# Patient Record
Sex: MALE | Race: WHITE | NOT HISPANIC OR LATINO | Employment: UNEMPLOYED | URBAN - METROPOLITAN AREA
[De-identification: names, ages, dates, MRNs, and addresses within clinical notes are randomized per-mention and may not be internally consistent; named-entity substitution may affect disease eponyms.]

---

## 2017-07-30 ENCOUNTER — APPOINTMENT (OUTPATIENT)
Dept: GENERAL RADIOLOGY | Facility: CLINIC | Age: 22
End: 2017-07-30
Attending: EMERGENCY MEDICINE
Payer: COMMERCIAL

## 2017-07-30 ENCOUNTER — HOSPITAL ENCOUNTER (EMERGENCY)
Facility: CLINIC | Age: 22
Discharge: HOME | End: 2017-07-30
Attending: EMERGENCY MEDICINE | Admitting: EMERGENCY MEDICINE
Payer: COMMERCIAL

## 2017-07-30 VITALS
TEMPERATURE: 98 F | OXYGEN SATURATION: 96 % | RESPIRATION RATE: 17 BRPM | WEIGHT: 150 LBS | HEIGHT: 70 IN | SYSTOLIC BLOOD PRESSURE: 133 MMHG | BODY MASS INDEX: 21.47 KG/M2 | HEART RATE: 76 BPM | DIASTOLIC BLOOD PRESSURE: 83 MMHG
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
## 2017-07-30 DIAGNOSIS — S93.402A SPRAIN OF LEFT ANKLE, UNSPECIFIED LIGAMENT, INITIAL ENCOUNTER: ICD-10-CM

## 2017-07-30 DIAGNOSIS — M25.572 ACUTE LEFT ANKLE PAIN: ICD-10-CM

## 2017-07-30 PROCEDURE — 25000132 ZZH RX MED GY IP 250 OP 250 PS 637: Performed by: EMERGENCY MEDICINE

## 2017-07-30 PROCEDURE — 99283 EMERGENCY DEPT VISIT LOW MDM: CPT

## 2017-07-30 PROCEDURE — 73610 X-RAY EXAM OF ANKLE: CPT | Mod: LT

## 2017-07-30 RX ORDER — IBUPROFEN 800 MG/1
800 TABLET, FILM COATED ORAL ONCE
Status: COMPLETED | OUTPATIENT
Start: 2017-07-30 | End: 2017-07-30

## 2017-07-30 RX ADMIN — IBUPROFEN 800 MG: 800 TABLET, FILM COATED ORAL at 07:17

## 2017-07-30 NOTE — ED AVS SNAPSHOT
[facility] Emergency Department    [address]    [facility] [address]    Phone:  [phone]    Fax:  [phone]                                       Abdulaziz Mart   MRN: [id number]    Department:  [facility] Emergency Department   Date of Visit:  7/30/2017           Patient Information     Date Of Birth          1995        Your diagnoses for this visit were:     Fall, initial encounter     Sprain of left ankle, unspecified ligament, initial encounter     Acute left ankle pain        You were seen by William Lozano MD.      Follow-up Information     Follow up with your primary doctor. Schedule an appointment as soon as possible for a visit in 3 days.        Discharge Instructions       Discharge Instructions  Splint Care    You had a splint put on today to help protect your injury and help it heal.  Splints are used to treat things like strains, sprains, cuts and fractures (broken bones).    Be sure your splint is not too tight!  If you splint is too tight, it may cause loss of blood supply.  Signs of your splint being too tight include:  your arm or leg hurting a lot more; your fingers or toes getting numb, cold, pale or blue; or your child is crying, fussing or seeming restless.    Return to the Emergency Department right away if:    You have increased pain or pressure around the injury.    You have numbness, tingling, or cool, pale, or blue toes or fingers past the injury.    Your child is more fussy than normal, crying a lot, or restless.    Your splint becomes soft, breaks, or is wet.    Your splint begins to smell bad.    Your splint is cutting into your skin.    Home care:    Keep the injured area above the level of your heart while laying or sitting down.  This will help decrease the swelling and the pain.    Keep the splint dry.    Do not put objects down or inside the splint.    If there is an elastic bandage (Ace  wrap) holding the splint on  this may be loosened slightly to relieve pressure or pain.  If pain continues return to the Emergency Department right away.    Do not remove your splint by yourself unless told to by your doctor.    Follow-up:  Sometimes the splint put on in the Emergency Department needs to be changed once the swelling has gone down and a more permanent cast needs to be placed.  This is usually done by a bone specialist doctor (Orthopedist).  Follow the instructions given to you by your doctor today.    X-rays:  X-rays done today were read by your doctor but will also be read by a radiologist.  We will contact you if the radiologist sees anything different on the x-ray.  Your regular doctor may also want to review your x-rays on follow-up.    You could have a fracture (break), even if we told you your x-rays were normal. X-rays are not always certain, and some fractures are hard to see and may not show up right away.  Also, your x-ray may look like you have a fracture, even though you do not.  It is important to follow-up with your regular doctor.     If you were given a prescription for medicine here today, be sure to read all of the information (including the package insert) that comes with your prescription.  This will include important information about the medicine, its side effects, and any warnings that you need to know about.  The pharmacist who fills the prescription can provide more information and answer questions you may have about the medicine.  If you have questions or concerns that the pharmacist cannot address, please call or return to the Emergency Department.   Opioid Medication Information    Pain medications are among the most commonly prescribed medicines, so we are including this information for all our patients. If you did not receive pain medication or get a prescription for pain medicine, you can ignore it.     You may have been given a prescription for an opioid (narcotic) pain medicine and/or have  received a pain medicine while here in the Emergency Department. These medicines can make you drowsy or impaired. You must not drive, operate dangerous equipment, or engage in any other dangerous activities while taking these medications. If you drive while taking these medications, you could be arrested for DUI, or driving under the influence. Do not drink any alcohol while you are taking these medications.     Opioid pain medications can cause addiction. If you have a history of chemical dependency of any type, you are at a higher risk of becoming addicted to pain medications.  Only take these prescribed medications to treat your pain when all other options have been tried. Take it for as short a time and as few doses as possible. Store your pain pills in a secure place, as they are frequently stolen and provide a dangerous opportunity for children or visitors in your house to start abusing these powerful medications. We will not replace any lost or stolen medicine.  As soon as your pain is better, you should flush all your remaining medication.     Many prescription pain medications contain Tylenol  (acetaminophen), including Vicodin , Tylenol #3 , Norco , Lortab , and Percocet .  You should not take any extra pills of Tylenol  if you are using these prescription medications or you can get very sick.  Do not ever take more than 3000 mg of acetaminophen in any 24 hour period.    All opioids tend to cause constipation. Drink plenty of water and eat foods that have a lot of fiber, such as fruits, vegetables, prune juice, apple juice and high fiber cereal.  Take a laxative if you don t move your bowels at least every other day. Miralax , Milk of Magnesia, Colace , or Senna  can be used to keep you regular.      Remember that you can always come back to the Emergency Department if you are not able to see your regular doctor in the amount of time listed above, if you get any new symptoms, or if there is anything that  worries you.      24 Hour Appointment Hotline       To make an appointment at any [facility], call [phone] ([phone]). If you don't have a family doctor or clinic, we will help you find one. [place] clinics are conveniently located to serve the needs of you and your family.             Review of your medicines      Our records show that you are taking the medicines listed below. If these are incorrect, please call your family doctor or clinic.        Dose / Directions Last dose taken    albuterol 108 (90 BASE) MCG/ACT Inhaler   Commonly known as:  PROAIR HFA/PROVENTIL HFA/VENTOLIN HFA   Dose:  2 puff   Quantity:  1 Inhaler        Inhale 2 puffs into the lungs every 4 hours as needed.   Refills:  0        fluticasone-salmeterol 100-50 MCG/DOSE diskus inhaler   Commonly known as:  ADVAIR DISKUS   Dose:  1 puff   Quantity:  1 Inhaler        Inhale 1 puff into the lungs 2 times daily.   Refills:  0        traZODone 50 MG tablet   Commonly known as:  DESYREL   Dose:  50 mg   Quantity:  30 tablet        Take 1 tablet by mouth At Bedtime.   Refills:  0                Procedures and tests performed during your visit     Ankle XR, G/E 3 views, left      Orders Needing Specimen Collection     None      Pending Results     Date and Time Order Name Status Description    7/30/2017 0642 Ankle XR, G/E 3 views, left Preliminary             Pending Culture Results     No orders found from 7/28/2017 to 7/31/2017.            Pending Results Instructions     If you had any lab results that were not finalized at the time of your Discharge, you can call the ED Lab Result RN at [phone]. You will be contacted by this team for any positive Lab results or changes in treatment. The nurses are available 7 days a week from 10A to 6:30P.  You can leave a message 24 hours per day and they will return your call.        Test Results From Your Hospital Stay        7/30/2017  7:34 AM      Narrative     LEFT ANKLE THREE OR MORE  VIEWS  7/30/2017  7:02 AM     HISTORY: Twisted.    COMPARISON: None.        Impression     IMPRESSION: No bony or soft tissue abnormality.                Clinical Quality Measure: Blood Pressure Screening     Your blood pressure was checked while you were in the emergency department today. The last reading we obtained was  BP: 142/84 . Please read the guidelines below about what these numbers mean and what you should do about them.  If your systolic blood pressure (the top number) is less than 120 and your diastolic blood pressure (the bottom number) is less than 80, then your blood pressure is normal. There is nothing more that you need to do about it.  If your systolic blood pressure (the top number) is 120-139 or your diastolic blood pressure (the bottom number) is 80-89, your blood pressure may be higher than it should be. You should have your blood pressure rechecked within a year by a primary care provider.  If your systolic blood pressure (the top number) is 140 or greater or your diastolic blood pressure (the bottom number) is 90 or greater, you may have high blood pressure. High blood pressure is treatable, but if left untreated over time it can put you at risk for heart attack, stroke, or kidney failure. You should have your blood pressure rechecked by a primary care provider within the next 4 weeks.  If your provider in the emergency department today gave you specific instructions to follow-up with your doctor or provider even sooner than that, you should follow that instruction and not wait for up to 4 weeks for your follow-up visit.        Thank you for choosing [facility]       Thank you for choosing [facility] for your care. Our goal is always to provide you with excellent care. Hearing back from our patients is one way we can continue to improve our services. Please take a few minutes to complete the written survey that you may receive in the mail after you visit with us. Thank you!        MyChart  "Information     [employer] lets you send messages to your doctor, view your test results, renew your prescriptions, schedule appointments and more. To sign up, go to [email or web address] . Click on \"Log in\" on the left side of the screen, which will take you to the Welcome page. Then click on \"Sign up Now\" on the right side of the page.     You will be asked to enter the access code listed below, as well as some personal information. Please follow the directions to create your username and password.     Your access code is: [email or web address]  Expires: 10/28/2017  7:48 AM     Your access code will  in 90 days. If you need help or a new code, please call your [place] clinic or [phone].        Care EveryWhere ID     This is your Care EveryWhere ID. This could be used by other organizations to access your [place] medical records  [id number]        Equal Access to Services     RODOLFO MORGAN AH: Hadii efrain mcmanuso Sominal, waaxda luqadaha, qaybta kaalmada adetayla, jose dan. So [facility] [phone].    ATENCIÓN: Si habla español, tiene a ahn disposición servicios gratuitos de asistencia lingüística. Llame al [phone].    We comply with applicable federal civil rights laws and [place] laws. We do not discriminate on the basis of race, color, national origin, age, disability sex, sexual orientation or gender identity.            After Visit Summary       This is your record. Keep this with you and show to your community pharmacist(s) and doctor(s) at your next visit.                  "

## 2017-07-30 NOTE — ED NOTES
Pt was out last night has been drinking   Tripped and now with  Left lateral ankle pain   Here for eval  Good pedal pulse

## 2017-07-30 NOTE — ED AVS SNAPSHOT
[facility] Emergency Department    [address]    [facility] [address]    Phone:  [phone]    Fax:  [phone]                                       Abdulaziz Mart   MRN: [id number]    Department:  [facility] Emergency Department   Date of Visit:  7/30/2017           After Visit Summary Signature Page     I have received my discharge instructions, and my questions have been answered. I have discussed any challenges I see with this plan with the nurse or doctor.    ..........................................................................................................................................  Patient/Patient Representative Signature      ..........................................................................................................................................  Patient Representative Print Name and Relationship to Patient    ..................................................               ................................................  Date                                            Time    ..........................................................................................................................................  Reviewed by Signature/Title    ...................................................              ..............................................  Date                                                            Time

## 2017-09-09 ENCOUNTER — HOSPITAL ENCOUNTER (INPATIENT)
Facility: CLINIC | Age: 22
LOS: 4 days | Discharge: HOME | End: 2017-09-13
Attending: EMERGENCY MEDICINE | Admitting: PSYCHIATRY & NEUROLOGY
Payer: COMMERCIAL

## 2017-09-09 DIAGNOSIS — F99 INSOMNIA DUE TO OTHER MENTAL DISORDER: ICD-10-CM

## 2017-09-09 DIAGNOSIS — F51.05 INSOMNIA DUE TO OTHER MENTAL DISORDER: ICD-10-CM

## 2017-09-09 DIAGNOSIS — F32.A DEPRESSION, UNSPECIFIED DEPRESSION TYPE: ICD-10-CM

## 2017-09-09 DIAGNOSIS — F10.10 ALCOHOL ABUSE: ICD-10-CM

## 2017-09-09 DIAGNOSIS — F10.229 ACUTE ALCOHOLIC INTOXICATION IN ALCOHOLISM, WITH UNSPECIFIED COMPLICATION (H): ICD-10-CM

## 2017-09-09 DIAGNOSIS — F41.9 ANXIETY: Primary | ICD-10-CM

## 2017-09-09 LAB
ALCOHOL BREATH TEST: 0.14 (ref 0–0.01)
ALCOHOL BREATH TEST: 0.22 (ref 0–0.01)
AMPHETAMINES UR QL SCN: NEGATIVE
BARBITURATES UR QL: NEGATIVE
BENZODIAZ UR QL: NEGATIVE
CANNABINOIDS UR QL SCN: NEGATIVE
COCAINE UR QL: NEGATIVE
ETHANOL UR QL SCN: POSITIVE
OPIATES UR QL SCN: NEGATIVE

## 2017-09-09 PROCEDURE — 99285 EMERGENCY DEPT VISIT HI MDM: CPT | Mod: Z6 | Performed by: EMERGENCY MEDICINE

## 2017-09-09 PROCEDURE — 80307 DRUG TEST PRSMV CHEM ANLYZR: CPT | Performed by: EMERGENCY MEDICINE

## 2017-09-09 PROCEDURE — 82075 ASSAY OF BREATH ETHANOL: CPT | Performed by: EMERGENCY MEDICINE

## 2017-09-09 PROCEDURE — 82075 ASSAY OF BREATH ETHANOL: CPT | Mod: 91 | Performed by: EMERGENCY MEDICINE

## 2017-09-09 PROCEDURE — HZ2ZZZZ DETOXIFICATION SERVICES FOR SUBSTANCE ABUSE TREATMENT: ICD-10-PCS | Performed by: PSYCHIATRY & NEUROLOGY

## 2017-09-09 PROCEDURE — 80320 DRUG SCREEN QUANTALCOHOLS: CPT | Performed by: EMERGENCY MEDICINE

## 2017-09-09 PROCEDURE — 99285 EMERGENCY DEPT VISIT HI MDM: CPT | Mod: 25 | Performed by: EMERGENCY MEDICINE

## 2017-09-09 PROCEDURE — 12400008 ZZH R&B MH INTERMEDIATE ADOLESCENT

## 2017-09-09 PROCEDURE — 25000132 ZZH RX MED GY IP 250 OP 250 PS 637: Performed by: PSYCHIATRY & NEUROLOGY

## 2017-09-09 PROCEDURE — 90791 PSYCH DIAGNOSTIC EVALUATION: CPT

## 2017-09-09 RX ORDER — OLANZAPINE 5 MG/1
10 TABLET ORAL
Status: DISCONTINUED | OUTPATIENT
Start: 2017-09-09 | End: 2017-09-13 | Stop reason: HOSPADM

## 2017-09-09 RX ORDER — DIAZEPAM 5 MG
5-20 TABLET ORAL EVERY 30 MIN PRN
Status: DISCONTINUED | OUTPATIENT
Start: 2017-09-09 | End: 2017-09-11

## 2017-09-09 RX ORDER — OLANZAPINE 10 MG/2ML
10 INJECTION, POWDER, FOR SOLUTION INTRAMUSCULAR
Status: DISCONTINUED | OUTPATIENT
Start: 2017-09-09 | End: 2017-09-13 | Stop reason: HOSPADM

## 2017-09-09 RX ORDER — ALUMINA, MAGNESIA, AND SIMETHICONE 2400; 2400; 240 MG/30ML; MG/30ML; MG/30ML
30 SUSPENSION ORAL EVERY 4 HOURS PRN
Status: DISCONTINUED | OUTPATIENT
Start: 2017-09-09 | End: 2017-09-13 | Stop reason: HOSPADM

## 2017-09-09 RX ORDER — ATENOLOL 50 MG/1
50 TABLET ORAL DAILY PRN
Status: DISCONTINUED | OUTPATIENT
Start: 2017-09-09 | End: 2017-09-13 | Stop reason: HOSPADM

## 2017-09-09 RX ORDER — ALBUTEROL SULFATE 90 UG/1
2 AEROSOL, METERED RESPIRATORY (INHALATION) EVERY 4 HOURS PRN
Status: DISCONTINUED | OUTPATIENT
Start: 2017-09-09 | End: 2017-09-13 | Stop reason: HOSPADM

## 2017-09-09 RX ORDER — NICOTINE 21 MG/24HR
1 PATCH, TRANSDERMAL 24 HOURS TRANSDERMAL DAILY
Status: DISCONTINUED | OUTPATIENT
Start: 2017-09-09 | End: 2017-09-13 | Stop reason: HOSPADM

## 2017-09-09 RX ORDER — TRAZODONE HYDROCHLORIDE 50 MG/1
50 TABLET, FILM COATED ORAL
Status: DISCONTINUED | OUTPATIENT
Start: 2017-09-09 | End: 2017-09-13 | Stop reason: HOSPADM

## 2017-09-09 RX ORDER — BISACODYL 10 MG
10 SUPPOSITORY, RECTAL RECTAL DAILY PRN
Status: DISCONTINUED | OUTPATIENT
Start: 2017-09-09 | End: 2017-09-13 | Stop reason: HOSPADM

## 2017-09-09 RX ORDER — LANOLIN ALCOHOL/MO/W.PET/CERES
100 CREAM (GRAM) TOPICAL DAILY
Status: COMPLETED | OUTPATIENT
Start: 2017-09-09 | End: 2017-09-11

## 2017-09-09 RX ORDER — ACETAMINOPHEN 325 MG/1
650 TABLET ORAL EVERY 4 HOURS PRN
Status: DISCONTINUED | OUTPATIENT
Start: 2017-09-09 | End: 2017-09-13 | Stop reason: HOSPADM

## 2017-09-09 RX ORDER — FOLIC ACID 1 MG/1
1 TABLET ORAL DAILY
Status: DISCONTINUED | OUTPATIENT
Start: 2017-09-09 | End: 2017-09-13

## 2017-09-09 RX ORDER — MULTIPLE VITAMINS W/ MINERALS TAB 9MG-400MCG
1 TAB ORAL DAILY
Status: DISCONTINUED | OUTPATIENT
Start: 2017-09-09 | End: 2017-09-13 | Stop reason: HOSPADM

## 2017-09-09 RX ORDER — HYDROXYZINE HYDROCHLORIDE 25 MG/1
25-50 TABLET, FILM COATED ORAL EVERY 4 HOURS PRN
Status: DISCONTINUED | OUTPATIENT
Start: 2017-09-09 | End: 2017-09-13 | Stop reason: HOSPADM

## 2017-09-09 RX ADMIN — MULTIPLE VITAMINS W/ MINERALS TAB 1 TABLET: TAB at 17:44

## 2017-09-09 RX ADMIN — FOLIC ACID 1 MG: 1 TABLET ORAL at 17:44

## 2017-09-09 RX ADMIN — NICOTINE 1 PATCH: 14 PATCH, EXTENDED RELEASE TRANSDERMAL at 17:46

## 2017-09-09 RX ADMIN — Medication 100 MG: at 17:44

## 2017-09-09 ASSESSMENT — ACTIVITIES OF DAILY LIVING (ADL)
GROOMING: INDEPENDENT
LAUNDRY: WITH SUPERVISION
DRESS: INDEPENDENT
ORAL_HYGIENE: INDEPENDENT

## 2017-09-09 NOTE — IP AVS SNAPSHOT
MRN:[id number]                      After Visit Summary   9/9/2017    Abdulaziz Mart    MRN: [id number]           Visit Information        Department      9/9/2017  4:23 AM Child Adolescent  Inpatient Unit         Further instructions from your care team       Behavioral Discharge Planning and Instructions      Summary: You were admitted on 9/9/2017 to [facility] for suicidal ideation.  You were treated by Debra Naegele, APRN, CNP and discharged on 9/13/17   to Home    Health Care Follow-up Appointments:   Medication Management  Date: Thursday, September 14, 2017  Time: 12pm. Check in at 1130am      Provider: Gaby Robert via [facility]  Address: [address] [place] [address]. [place], [place]   Phone:[phone]   [facility] has faxed the Discharge Summary and AVS to this provider at Fax: [phone]      Therapy Appointment   Date: Wednesday, September 27, 2017  Time: 11am      Provider: Liborio Cornejo  Address: [address] [place] [address], [facility]  Phone:[phone]      AA Resources   Go to [email or web address] to find the AA meeting closest to you  Alcoholics Anonymous ([email or web address]-anonymous.org)    Attend all scheduled appointments with your outpatient providers. Call at least 24 hours in advance if you need to reschedule an appointment to ensure continued access to your outpatient providers.   Major Treatments, Procedures and Findings: You were provided with: a psychiatric assessment, assessed for medical stability, medication evaluation and/or management and group therapy    Symptoms to Report: feeling more aggressive, increased confusion, losing more sleep, mood getting worse or thoughts of suicide    Early warning signs can include:  increased depression or anxiety sleep disturbances increased thoughts or behaviors of suicide or self-harm     Safety and Wellness:  Take all medicines as directed.  Make no changes unless your doctor suggests them.      Follow treatment  "recommendations.  Refrain from alcohol and non-prescribed drugs.  If there is a concern for safety, call 911.    Resources: Mental health crisis response for your county is offered 24 hours a day, 7 days a week. A trained counselor will assess your current situation, offer support and counseling and connect you with local resources.    [facility] Crisis Response [phone]    The treatment team has appreciated the opportunity to work with you. Drea, please take care and make your recovery a daily recovery. If you have any questions or concerns our unit number is [phone].  You will be receiving a follow-up phone call within the next three days from a representative from behavioral [employer].  You have identified the best phone number to reach you as [phone]                [employer] Information     [employer] lets you send messages to your doctor, view your test results, renew your prescriptions, schedule appointments and more. To sign up, go to [email or web address] . Click on \"Log in\" on the left side of the screen, which will take you to the Welcome page. Then click on \"Sign up Now\" on the right side of the page.     You will be asked to enter the access code listed below, as well as some personal information. Please follow the directions to create your username and password.     Your access code is: [email or web address]  Expires: 10/28/2017  7:48 AM     Your access code will  in 90 days. If you need help or a new code, please call your [place] clinic or [phone].        Care EveryWhere ID     This is your Care EveryWhere ID. This could be used by other organizations to access your [place] medical records  [id number]        Equal Access to Services     [facility] : Edenilson Turcios, arnie salazar, jose cobb. So [facility] [phone].    ATENCIÓN: Si habla español, tiene a ahn disposición servicios gratuitos de asistencia " lingüística. Dillon al [phone].    We comply with applicable federal civil rights laws and [place] laws. We do not discriminate on the basis of race, color, national origin, age, disability sex, sexual orientation or gender identity.

## 2017-09-09 NOTE — IP AVS SNAPSHOT
MRN:[id number]                      After Visit Summary   9/9/2017    Abdulaziz Mart    MRN: [id number]           Thank you!     Thank you for choosing [facility] for your care. Our goal is always to provide you with excellent care.        Patient Information     Date Of Birth          1995        Designated Caregiver       Most Recent Value    Caregiver    Will someone help with your care after discharge? yes    Name of designated caregiver Kyle Harrell    Phone number of caregiver [phone]    Caregiver address [id number] [place], [place] [id number]      About your hospital stay     You were admitted on:  September 9, 2017 You last received care in the:  Child Adolescent  Inpatient Unit    You were discharged on:  September 13, 2017       Who to Call     For medical emergencies, please call 911.  For non-urgent questions about your medical care, please call your primary care provider or clinic, [phone]          Attending Provider     Provider Specialty    Noe Echeverria MD Emergency Medicine    Merly Navas MD Emergency Medicine    Agapito Duarte MD Psychiatry       Primary Care Provider Office Phone # Fax #    [facility] [phone]      Further instructions from your care team       Behavioral Discharge Planning and Instructions      Summary: You were admitted on 9/9/2017 to Station [facility] for suicidal ideation.  You were treated by Debra Naegele, APRN, CNP and discharged on 9/13/17   to Home    Health Care Follow-up Appointments:   Medication Management  Date: Thursday, September 14, 2017  Time: 12pm. Check in at 1130am      Provider: Gaby Robert via Tele Psychiatry  Address: [address] [place], [place]   Phone:[phone]   [facility] has faxed the Discharge Summary and AVS to this provider at Fax: [phone]      Therapy Appointment   Date: Wednesday, September 27, 2017  Time: 11am      Provider: Liborio  Clemente  Address: [address]  Phone:[phone]      AA Resources   Go to [email or web address] to find the AA meeting closest to you  Alcoholics Anonymous (www.alcoholics-anonymous.org)    Attend all scheduled appointments with your outpatient providers. Call at least 24 hours in advance if you need to reschedule an appointment to ensure continued access to your outpatient providers.   Major Treatments, Procedures and Findings: You were provided with: a psychiatric assessment, assessed for medical stability, medication evaluation and/or management and group therapy    Symptoms to Report: feeling more aggressive, increased confusion, losing more sleep, mood getting worse or thoughts of suicide    Early warning signs can include:  increased depression or anxiety sleep disturbances increased thoughts or behaviors of suicide or self-harm     Safety and Wellness:  Take all medicines as directed.  Make no changes unless your doctor suggests them.      Follow treatment recommendations.  Refrain from alcohol and non-prescribed drugs.  If there is a concern for safety, call 911.    Resources: Mental health crisis response for your [facility] is offered 24 hours a day, 7 days a week. A trained counselor will assess your current situation, offer support and counseling and connect you with local resources.    [facility] Crisis Response [phone]    The treatment team has appreciated the opportunity to work with you. Drea, please take care and make your recovery a daily recovery. If you have any questions or concerns our unit number is [phone].  You will be receiving a follow-up phone call within the next three days from a representative from behavioral health.  You have identified the best phone number to reach you as [phone]                Pending Results     No orders found from 9/7/2017 to 9/10/2017.            Admission Information     Date & Time Provider Department Dept. Phone     "2017 Agapito Duarte MD Child Adolescent  Inpatient Unit [phone]      Your Vitals Were     Blood Pressure Pulse Temperature Respirations Height Weight    136/78 71 97.8  F (36.6  C) (Oral) 16 1.727 m (5' 8\") 55.2 kg (121 lb 11.2 oz)    Pulse Oximetry BMI (Body Mass Index)                98% 18.5 kg/m2          [employer] Information     [employer] lets you send messages to your doctor, view your test results, renew your prescriptions, schedule appointments and more. To sign up, go to [email or web address] . Click on \"Log in\" on the left side of the screen, which will take you to the Welcome page. Then click on \"Sign up Now\" on the right side of the page.     You will be asked to enter the access code listed below, as well as some personal information. Please follow the directions to create your username and password.     Your access code is: [email or web address]  Expires: 10/28/2017  7:48 AM     Your access code will  in 90 days. If you need help or a new code, please call your [facility] clinic or [phone].        Care EveryWhere ID     This is your Care EveryWhere ID. This could be used by other organizations to access your [facility] medical records  [id number]        Equal Access to Services     RODOLOF MORGAN AH: Hadii efrain river Sominal, waaxda luqadaha, qaybta kaalmada suman, jose dan. So [facility] [phone].    ATENCIÓN: Si habla español, tiene a ahn disposición servicios gratuitos de asistencia lingüística. Dillon al [phone].    We comply with applicable federal civil rights laws and [place] laws. We do not discriminate on the basis of race, color, national origin, age, disability sex, sexual orientation or gender identity.               Review of your medicines      START taking        Dose / Directions    escitalopram 10 MG tablet   Commonly known as:  LEXAPRO   Used for:  Depression, unspecified depression type        Dose:  10 mg   Take 1 tablet (10 " mg) by mouth daily   Quantity:  30 tablet   Refills:  1       hydrOXYzine 25 MG tablet   Commonly known as:  ATARAX   Used for:  Anxiety        Dose:  25-50 mg   Take 1-2 tablets (25-50 mg) by mouth every 4 hours as needed for anxiety   Quantity:  120 tablet   Refills:  1       traZODone 50 MG tablet   Commonly known as:  DESYREL   Used for:  Insomnia due to other mental disorder        Dose:  50 mg   Take 1 tablet (50 mg) by mouth nightly as needed for sleep   Quantity:  90 tablet   Refills:  1         CONTINUE these medicines which have NOT CHANGED        Dose / Directions    albuterol 108 (90 BASE) MCG/ACT Inhaler   Commonly known as:  PROAIR HFA/PROVENTIL HFA/VENTOLIN HFA   Used for:  Mild persistent asthma        Dose:  2 puff   Inhale 2 puffs into the lungs every 4 hours as needed.   Quantity:  1 Inhaler   Refills:  0            Where to get your medicines      These medications were sent to [facility] [address]  [address] [address]     Phone:  [phone]     escitalopram 10 MG tablet    hydrOXYzine 25 MG tablet    traZODone 50 MG tablet                Protect others around you: Learn how to safely use, store and throw away your medicines at www.disposemymeds.org.             Medication List: This is a list of all your medications and when to take them. Check marks below indicate your daily home schedule. Keep this list as a reference.      Medications           Morning Afternoon Evening Bedtime As Needed    albuterol 108 (90 BASE) MCG/ACT Inhaler   Commonly known as:  PROAIR HFA/PROVENTIL HFA/VENTOLIN HFA   Inhale 2 puffs into the lungs every 4 hours as needed.   Last time this was given:  2 puffs on 9/12/2017  8:41 AM                                escitalopram 10 MG tablet   Commonly known as:  LEXAPRO   Take 1 tablet (10 mg) by mouth daily   Last time this was given:  10 mg on 9/13/2017  8:40 AM                                hydrOXYzine  25 MG tablet   Commonly known as:  ATARAX   Take 1-2 tablets (25-50 mg) by mouth every 4 hours as needed for anxiety   Last time this was given:  50 mg on 9/12/2017  9:18 AM                                traZODone 50 MG tablet   Commonly known as:  DESYREL   Take 1 tablet (50 mg) by mouth nightly as needed for sleep   Last time this was given:  50 mg on 9/12/2017  9:02 PM

## 2017-09-09 NOTE — PROGRESS NOTES
23 yo male arrived on stn 7A [id number], voluntary basis. He is calm and cooperative, bland affect, vss. No acute s/sx w/d, per assessment of Christopher BILLY RN. See intake report for pt's story. Per ED report, dx include depression, anxiety, and schizophrenia; no medical issues-albuterol inhaler listed on med rec. Will report off to [facility] for completion of admission profile. Admission orders received from Dr Zaldivar.

## 2017-09-09 NOTE — ED NOTES
Has been attemping SI since May tried to hang self and friends saved him has been trying 3 to 4 times tonight just has the thoughts of hanging self

## 2017-09-09 NOTE — IP AVS SNAPSHOT
Medication List       Patient:  KARI WILSON   :  May 20, 1995   Physician:  [facility], [place]           This is your record.  Keep this with you and show to your community pharmacist(s) and physician(s) at each visit.     Allergies:  ANIMAL DANDER - (reactions not documented)     OTHER [SEASONAL ALLERGIES] - (reactions not documented)               Medications  Valid as of: 2017 -  3:28 PM    Generic Name Brand Name Tablet Size Instructions for use    Albuterol Sulfate PROAIR HFA/PROVENTIL HFA/VENTOLIN  (90 BASE) MCG/ACT Inhale 2 puffs into the lungs every 4 hours as needed.        Escitalopram Oxalate LEXAPRO 10 MG Take 1 tablet (10 mg) by mouth daily        HydrOXYzine HCl ATARAX 25 MG Take 1-2 tablets (25-50 mg) by mouth every 4 hours as needed for anxiety        TraZODone HCl DESYREL 50 MG Take 1 tablet (50 mg) by mouth nightly as needed for sleep        .           .           .           .

## 2017-09-09 NOTE — ED PROVIDER NOTES
Sign out note: Abdulaziz Mart is a 22 year old male was signed out to me by Dr. Echeverria at 0720 am.  Please see initial dictation for full details and history.  Patient has schizophrenia and is off medications.  He is intoxicated and apparently suicidal..  Plan at time of sign out is to have the BEC  evaluate the patient once he is more sober..   The patient breathalyzed 0.222 at approximate 5 AM.      Course in the ED:  The patient was re-breathalyzed at approximately 1110 am. His breathalyzer is currently 0.135.   He was seen by BEC  Ashley at about 12 noon.  He states he was upset and drinking last night and a friend was talking about his ex-girlfriend, who cheated on him years ago. He has a depressed mood and has tried to hang himself in the past while intoxicated. He states he always has suicidal ideation. He has a h/o Adderal and methamphetamine abuse although he has used neither for a year.   Ashley was able to speak to 3 of this patient's family members.  Each report that the patient appears more and more depressed.  According to his sister, he tried to hang himself in May and actually had to be cut down.  The patient  told Ashley that he has had 5 prior attempts at hanging but is generally too intoxicated to tie the knot.     The patient is a 22-year-old substance abuser who has chronic suicidal ideation, and has tried to hang himself while intoxicated.  His family members are concerned that his depression is worsening.  The patient is willing to be admitted to the hospital today.  He will be admitted to the mental health unit for stabilization.  He will be admitted to station 7A under the care of Dr. Duarte.  He was admitted upstairs at approximately 1430 pm  Clinical impression: #1.  Depression #2, alcohol abuse, #3 acute alcohol intoxication.  MD Eloise Barroso, Merly SEQUEIRA MD  09/09/17 1440

## 2017-09-09 NOTE — IP AVS SNAPSHOT
Child Adolescent  Inpatient Unit    [address] [facility] [address]    Phone:  [phone]    Fax:  [phone]                                       After Visit Summary   9/9/2017    Abdulaziz Mart    MRN: [id number]           After Visit Summary Signature Page     I have received my discharge instructions, and my questions have been answered. I have discussed any challenges I see with this plan with the nurse or doctor.    ..........................................................................................................................................  Patient/Patient Representative Signature      ..........................................................................................................................................  Patient Representative Print Name and Relationship to Patient    ..................................................               ................................................  Date                                            Time    ..........................................................................................................................................  Reviewed by Signature/Title    ...................................................              ..............................................  Date                                                            Time

## 2017-09-09 NOTE — PROGRESS NOTES
"Admission:     Admitted to 4A, young adult unit, for increasing suicidal ideation. States he attempted suicide by hanging \"sometime last summer\" and was stopped by a friend. States he drinks excessively to intoxication, 2-3 times per week. Denies hx of seizures, but admits to DT's. Placed on [facility] protocol for alcohol withdrawal. Has a 1 1/2 year old son that he has never met. States he learned of the birth on facebook. Does report that he pays child support. States his sons mother \"does not want him to come to the [facility]\". Patient currently lives with his father, and 3 younger sisters in [place].     Is ambivalent when asked if he would like to have a relationship with his son. Cites other stressors as a job loss. States he was working as a  and adds, \"but I hate people\". States he quit high school. States he would like to obtain his GED and attend college to study science.     Reports being admitted to unit 6AE at 15, and again at 17 years old. He attended inpatient CD treatment at [facility] when he was 15. And was admitted to [facility] at 17 before signing himself out. Denies any mental health, or CD admissions since. Denies any interventions for depression. Denies hx of schizophrenia, and is currently not on any prescription medications, and denies ever taking prescription medications for depression or psychosis. Does endorse occasional auditory hallucinations, but denies currently. Voices opposition to taking medications.     Mood is irritable, but is calm, polite, and cooperative completing admission. Affect is flat. Endorses suicidal ideation and passive urges to self harm. States he burns himself with a lighter. Has a healed burn on his right forearm that he states was the most recent. Does verbally contract for safety on the unit. Encouraged to notify staff of needs, questions, and concerns.     "

## 2017-09-09 NOTE — PROGRESS NOTES
Patient has a history of DT's and seizures with withdrawal.  [facility]  Seizure pads are placed on the floor near patients bed.

## 2017-09-09 NOTE — ED PROVIDER NOTES
"  History     Chief Complaint   Patient presents with     Suicidal     started My 20th SI with plan hanging self     HPI  Abdulaziz Mart is a 22 year old male who is here currently intoxicated.  He is a poor historian.  He prefers to sleep at this point rather than give history.  He says he has a history of depression and anxiety and schizophrenia and is not on any medications.  He says he does not believe in medications.  He is not sure why he is here.  He will need to be evaluated once he is clinically sober which be a few hours.    Past Medical History:   Diagnosis Date     Alcohol abuse      Asthma      Oppositional defiant disorder, mild      Seasonal allergies      Sleep disorder        Social History     Social History     Marital status: Single     Spouse name: N/A     Number of children: N/A     Years of education: N/A     Occupational History     Not on file.     Social History Main Topics     Smoking status: Current Every Day Smoker     Packs/day: 0.25     Smokeless tobacco: Never Used     Alcohol use Yes      Comment: up to 1 liter  every week end     Drug use: No      Comment: daily     Sexual activity: Yes     Other Topics Concern     Not on file     Social History Narrative    Abdulaziz lives with his mom, step-dad and 3 younger sisters.  He has an older sister that lives out of the home. He's in the 12th grade at [employer], and is already \"missing some credits\" to be able to graduate.  He has 1 minor consumption charge and 2 paraphernalia charges against him.          I have reviewed the Medications, Allergies, Past Medical and Surgical History, and Social History in the Epic system.    Review of Systems   Unable to perform ROS: Mental status change   All other systems reviewed and are negative.      Physical Exam   BP: 127/82  Pulse: 64  Temp: 98.2  F (36.8  C)  Resp: 16  Height: 172.7 cm (5' 8\")  Weight: 55 kg (121 lb 3.2 oz)  SpO2: 98 %  Physical Exam   Constitutional: He appears " well-developed and well-nourished. No distress.   HENT:   Head: Atraumatic.   Eyes: Pupils are equal, round, and reactive to light.   Cardiovascular: Normal rate, regular rhythm and normal heart sounds.    Pulmonary/Chest: Effort normal and breath sounds normal.   Neurological:   Intoxicated   Skin: Skin is warm. He is not diaphoretic.   Psychiatric: His affect is blunt. His speech is slurred. He is withdrawn. He expresses impulsivity and inappropriate judgment.   Nursing note and vitals reviewed.      ED Course     ED Course     Procedures        Medications   thiamine tablet 100 mg (100 mg Oral Given 9/11/17 0859)            Labs Ordered and Resulted from Time of ED Arrival Up to the Time of Departure from the ED   DRUG ABUSE SCREEN 6 CHEM DEP URINE ([facility]) - Abnormal; Notable for the following:        Result Value    Ethanol Qual Urine Positive (*)     All other components within normal limits   ALCOHOL BREATH TEST POCT - Abnormal; Notable for the following:     Alcohol Breath Test 0.222 (*)     All other components within normal limits   ALCOHOL BREATH TEST POCT - Abnormal; Notable for the following:     Alcohol Breath Test 0.135 (*)     All other components within normal limits            Assessments & Plan (with Medical Decision Making)   22-year-old male here with acute alcohol intoxication unwilling to cooperate with exam and history at this point.  He does carry a diagnosis of schizophrenia and is not on any medication.  He'll be Safe in the emergency department until sober and then evaluated by BEC.  If he does not need to be admitted from a mental health perspective he can be discharged home with the advice to stop drinking and/or get into CD treatment patient was signed out to the day provider with the plan for BEC evaluation once sober.    I have reviewed the nursing notes.    I have reviewed the findings, diagnosis, plan and need for follow up with the patient.    Discharge Medication List as of  9/13/2017  3:28 PM      START taking these medications    Details   hydrOXYzine (ATARAX) 25 MG tablet Take 1-2 tablets (25-50 mg) by mouth every 4 hours as needed for anxiety, Disp-120 tablet, R-1, E-Prescribe      escitalopram (LEXAPRO) 10 MG tablet Take 1 tablet (10 mg) by mouth daily, Disp-30 tablet, R-1, E-Prescribe      traZODone (DESYREL) 50 MG tablet Take 1 tablet (50 mg) by mouth nightly as needed for sleep, Disp-90 tablet, R-1, E-Prescribe             Final diagnoses:   Depression, unspecified depression type   Alcohol abuse   Acute alcoholic intoxication in alcoholism, with unspecified complication (H)       9/9/2017   [facility], [place], EMERGENCY DEPARTMENT     Noe Echeverria MD  09/15/17 0049

## 2017-09-09 NOTE — PHARMACY-ADMISSION MEDICATION HISTORY
Admission medication history interview status for the 9/9/2017 admission is complete. See Epic admission navigator for allergy information, pharmacy, prior to admission medications and immunization status.     Medication history interview sources:  patient    Changes made to PTA medication list (reason)  Added: none  Deleted: Advair 100-50 (patient not taking), trazodone 50mg (patient not taking)  Changed: none    Additional medication history information (including reliability of information, actions taken by pharmacist): patient with a reliable medication history.  He only uses albuterol inhaler as needed and no longer uses Advair or trazodone.  He stopped taking these on his own.  Allergies verified and updated.  No allergies to medications.      Prior to Admission medications    Medication Sig Last Dose Taking? Auth Provider   albuterol (PROVENTIL HFA: VENTOLIN HFA) 108 (90 BASE) MCG/ACT inhaler Inhale 2 puffs into the lungs every 4 hours as needed. PRN Yes Darian Banerjee MD       Medication history completed by: Jonathon Mejias, DamirD

## 2017-09-10 LAB
ALBUMIN SERPL-MCNC: 3.7 G/DL (ref 3.4–5)
ALP SERPL-CCNC: 90 U/L (ref 40–150)
ALT SERPL W P-5'-P-CCNC: 21 U/L (ref 0–70)
ANION GAP SERPL CALCULATED.3IONS-SCNC: 10 MMOL/L (ref 3–14)
AST SERPL W P-5'-P-CCNC: 18 U/L (ref 0–45)
BASOPHILS # BLD AUTO: 0 10E9/L (ref 0–0.2)
BASOPHILS NFR BLD AUTO: 0.7 %
BILIRUB SERPL-MCNC: 1 MG/DL (ref 0.2–1.3)
BUN SERPL-MCNC: 12 MG/DL (ref 7–30)
CALCIUM SERPL-MCNC: 9 MG/DL (ref 8.5–10.1)
CHLORIDE SERPL-SCNC: 107 MMOL/L (ref 94–109)
CHOLEST SERPL-MCNC: 163 MG/DL
CO2 SERPL-SCNC: 24 MMOL/L (ref 20–32)
CREAT SERPL-MCNC: 0.88 MG/DL (ref 0.66–1.25)
DIFFERENTIAL METHOD BLD: NORMAL
EOSINOPHIL # BLD AUTO: 0.2 10E9/L (ref 0–0.7)
EOSINOPHIL NFR BLD AUTO: 4.8 %
ERYTHROCYTE [DISTWIDTH] IN BLOOD BY AUTOMATED COUNT: 11.8 % (ref 10–15)
GFR SERPL CREATININE-BSD FRML MDRD: >90 ML/MIN/1.7M2
GLUCOSE SERPL-MCNC: 80 MG/DL (ref 70–99)
HCT VFR BLD AUTO: 44.1 % (ref 40–53)
HDLC SERPL-MCNC: 78 MG/DL
HGB BLD-MCNC: 14.8 G/DL (ref 13.3–17.7)
IMM GRANULOCYTES # BLD: 0 10E9/L (ref 0–0.4)
IMM GRANULOCYTES NFR BLD: 0.2 %
LDLC SERPL CALC-MCNC: 68 MG/DL
LYMPHOCYTES # BLD AUTO: 2.1 10E9/L (ref 0.8–5.3)
LYMPHOCYTES NFR BLD AUTO: 44.8 %
MCH RBC QN AUTO: 31 PG (ref 26.5–33)
MCHC RBC AUTO-ENTMCNC: 33.6 G/DL (ref 31.5–36.5)
MCV RBC AUTO: 93 FL (ref 78–100)
MONOCYTES # BLD AUTO: 0.5 10E9/L (ref 0–1.3)
MONOCYTES NFR BLD AUTO: 11.1 %
NEUTROPHILS # BLD AUTO: 1.8 10E9/L (ref 1.6–8.3)
NEUTROPHILS NFR BLD AUTO: 38.4 %
NONHDLC SERPL-MCNC: 85 MG/DL
NRBC # BLD AUTO: 0 10*3/UL
NRBC BLD AUTO-RTO: 0 /100
PLATELET # BLD AUTO: 196 10E9/L (ref 150–450)
POTASSIUM SERPL-SCNC: 3.6 MMOL/L (ref 3.4–5.3)
PROT SERPL-MCNC: 7.5 G/DL (ref 6.8–8.8)
RBC # BLD AUTO: 4.77 10E12/L (ref 4.4–5.9)
SODIUM SERPL-SCNC: 141 MMOL/L (ref 133–144)
TRIGL SERPL-MCNC: 83 MG/DL
TSH SERPL DL<=0.005 MIU/L-ACNC: 2.65 MU/L (ref 0.4–4)
WBC # BLD AUTO: 4.6 10E9/L (ref 4–11)

## 2017-09-10 PROCEDURE — 80061 LIPID PANEL: CPT | Performed by: PSYCHIATRY & NEUROLOGY

## 2017-09-10 PROCEDURE — 25000132 ZZH RX MED GY IP 250 OP 250 PS 637: Performed by: PSYCHIATRY & NEUROLOGY

## 2017-09-10 PROCEDURE — 84443 ASSAY THYROID STIM HORMONE: CPT | Performed by: PSYCHIATRY & NEUROLOGY

## 2017-09-10 PROCEDURE — 85025 COMPLETE CBC W/AUTO DIFF WBC: CPT | Performed by: PSYCHIATRY & NEUROLOGY

## 2017-09-10 PROCEDURE — 12400008 ZZH R&B MH INTERMEDIATE ADOLESCENT

## 2017-09-10 PROCEDURE — 36415 COLL VENOUS BLD VENIPUNCTURE: CPT | Performed by: PSYCHIATRY & NEUROLOGY

## 2017-09-10 PROCEDURE — 99223 1ST HOSP IP/OBS HIGH 75: CPT | Mod: AI | Performed by: PSYCHIATRY & NEUROLOGY

## 2017-09-10 PROCEDURE — 80053 COMPREHEN METABOLIC PANEL: CPT | Performed by: PSYCHIATRY & NEUROLOGY

## 2017-09-10 RX ORDER — ESCITALOPRAM OXALATE 10 MG/1
10 TABLET ORAL DAILY
Status: DISCONTINUED | OUTPATIENT
Start: 2017-09-10 | End: 2017-09-13 | Stop reason: HOSPADM

## 2017-09-10 RX ADMIN — MULTIPLE VITAMINS W/ MINERALS TAB 1 TABLET: TAB at 07:50

## 2017-09-10 RX ADMIN — ESCITALOPRAM OXALATE 10 MG: 10 TABLET ORAL at 11:54

## 2017-09-10 RX ADMIN — NICOTINE 1 PATCH: 14 PATCH, EXTENDED RELEASE TRANSDERMAL at 07:51

## 2017-09-10 RX ADMIN — DIAZEPAM 5 MG: 5 TABLET ORAL at 10:07

## 2017-09-10 RX ADMIN — FOLIC ACID 1 MG: 1 TABLET ORAL at 07:50

## 2017-09-10 RX ADMIN — ALBUTEROL SULFATE 2 PUFF: 90 AEROSOL, METERED RESPIRATORY (INHALATION) at 01:52

## 2017-09-10 RX ADMIN — Medication 100 MG: at 07:50

## 2017-09-10 ASSESSMENT — ACTIVITIES OF DAILY LIVING (ADL)
HYGIENE/GROOMING: INDEPENDENT
DRESS: SCRUBS (BEHAVIORAL HEALTH)
DRESS: SCRUBS (BEHAVIORAL HEALTH)
ORAL_HYGIENE: INDEPENDENT
ORAL_HYGIENE: INDEPENDENT
HYGIENE/GROOMING: INDEPENDENT

## 2017-09-10 NOTE — PROGRESS NOTES
09/10/17 0242   Patient Belongings   Did you bring any home meds/supplements to the hospital?  No   Patient Belongings cell phone/electronics;clothing   Disposition of Belongings All items are locked in storage locker [address].   Belongings Search Yes  ([facility] PEDERSON conducted belongings search.)     Items stored in storage locker [address]: batman hooded sweatshirt w/ strings, black t-shirt, black shorts w/ strings, [employer] smartphone.    LG Smartphone sent home at 143pm on 10 September 2017      A               Admission:  I am responsible for any personal items that are not sent to the safe or pharmacy.  [facility] is not responsible for loss, theft or damage of any property in my possession.    Signature:  _________________________________ Date: _______  Time: _____                                              Staff Signature:  ____________________________ Date: ________  Time: _____      2nd Staff person, if patient is unable/unwilling to sign:    Signature: ________________________________ Date: ________  Time: _____     Discharge:  [facility] has returned all of my personal belongings:    Signature: _________________________________ Date: ________  Time: _____                                          Staff Signature:  ____________________________ Date: ________  Time: _____

## 2017-09-10 NOTE — PROGRESS NOTES
Pt came to this RN, stating he felt he was starting to have sx etoh w/d. MSSA score now 8-some tremors and mild diaphoresis, restlessness. Valium 5 mg given, and will re check at noon.

## 2017-09-10 NOTE — PLAN OF CARE
Problem: General Plan of Care (Inpatient Behavioral)  Goal: Team Discussion  Team Plan:   BEHAVIORAL TEAM DISCUSSION     Continued Stay Criteria/Rationale: Patient admitted voluntarily due to suicidal ideation     Plan: Psychiatric Assessment. Medication Management. Therapeutic Mileu. Individual and group support.  Participants: Jaimee Bal RN and Stefania PAUL  Summary/Recommendation: The plan is to assess the patient for mental health and medication needs.  The patient will be prescribed medications to treat the identified symptoms.  Upon discharge the patient will be referred to services as appropriate based on the assessment.  Medical/Physical: Per internal med consult  Progress: Initial assessment     Precautions:   Behavioral Orders   Procedures     Code 1 - Restrict to Unit     [place]     MMPI 2     Routine Programming       As clinically indicated     Seizure precautions     Status 15       Every 15 minutes.     Suicide precautions     Withdrawal precautions

## 2017-09-10 NOTE — PROGRESS NOTES
Initial Psychosocial Assessment    I have reviewed the chart, met with the patient, and developed Care Plan.  Information for assessment was obtained from: Patient and Medical Chart    Presenting Problem:  Admitted voluntarily to [facility] for suicidal ideation and alcohol abuse    History of Mental Health and Chemical Dependency:  The patient attempted suicide last summer (by hanging) and SIB (burning himself with a lighter.  He has a diagnosis history of ODD and polysubstance abuse (Adderrall, Marijuana.  Reports being admitted to unit 6AE at 15, and again at 17 years old. He attended inpatient CD treatment at [facility] when he was 15. And was admitted to 6AE at 17 before signing himself out. Denies any mental health, or CD admissions since. Denies any interventions for depression. Denies hx of schizophrenia, and is currently not on any prescription medications, and denies ever taking prescription medications for depression or psychosis. Does endorse occasional auditory hallucinations, but denies currently. Voices opposition to taking medications.     Utox was positive for ETOH and pt breathalyzed at .135.  States that he drinks to intoxication 2-3x/week. He reports drinking to mediate stress.   And has been drinking more since he quit high school.  Denies seizures, admits DT's.  He is currently on an [facility] protocol for withdrawal.  He smokes cigarettes daily and says he stopped skateboarding and doing a lot of other fun things when he started using drugs.  He might be interested in CD treatment.    Family Description (Constellation, Family Psychiatric History):  He has a father and three sisters who are supportive.  He reports having a 1.5 year old son that he has never met.  He learned of the birth on facebook and reports paying child support.  Chart indicates a family history of psychotic disorder (mother - also lives in [place] -  from father last year).  No other mental health or  "substance use problems endorsed    Significant Life Events (Illness, Abuse, Trauma, Death):  Moved out of his mother's house because his mother is \"a psychotic drug addict who acts like a teenager\".  Reports she was not like this before the divorce.  Parents  last year.  He endorses having a friend who completed a suicide 4 years ago by self inflicted gun shot.      Living Situation:  Patient currently lives with his father, and 3 younger sisters in [facility]).  Had been living with his mother until 6 weeks ago.      Educational Background:  States he quit high school. States he would like to obtain his GED and attend college to study science.     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    Financial Status:  Insurance is provided by [employer] program    Legal Issues:  Voluntarily admitted  DUI Feb 2016 - still has some classes to complete  Domestic assault four years ago - on probation but doesn't have an assign PO - needs to check in with them every 3 months    Ethnic/Cultural Issues:  22 year old  male    Spiritual Orientation:  Not designated     Service History:  None    Social Functioning (organization, interests):  Skateboarding (but hasn't done it in a while); says he likes playing pool    Current Treatment Providers are:  PCP:  [facility] [phone]    Social Service Assessment/Plan:  Consider CD assessment and possibly MI/CD treatment  Patient will have psychiatric assessment and medication management by the psychiatrist. Medications will be reviewed and adjusted per MD as indicated. The treatment team will continue to assess and stabilize the patient's mental health symptoms with the use of medications and therapeutic programming. Hospital staff will provide a safe environment and a therapeutic milieu. Staff will continue to assess patient as " needed. Patient will participate in unit groups and activities. Patient will receive individual and group support on the unit.     CTC will do individual inpatient treatment planning and after care planning. CTC will discuss options for increasing community supports with the patient. CTC will coordinate with outpatient providers and will place referrals to ensure appropriate follow up care is in place.

## 2017-09-10 NOTE — H&P
"DATE OF ASSESSMENT:  09/10/2017      IDENTIFYING INFORMATION:  Abdulaziz Mart is a 22-year-old single  male, currently unemployed and residing with his father.      CHIEF COMPLAINT:  \"I was feeling suicidal.\"      HISTORY OF PRESENT ILLNESS:  The patient has a history of major depressive disorder and chemical addiction with prominent usage of alcohol.  He presented voluntarily to the emergency room after he called 911 to report depressed mood and suicidal thoughts.  In the emergency room, his alcohol breath test was 0.222 on arrival with his urine drug screen negative for other illicit substances.  He was admitted voluntarily.  On examination today, he reports that over the past several years he has felt quite depressed, recalling a prior diagnosis of dysthymia.  He was able to identify a pattern of worsening depressive symptoms, typically in the setting of alcohol intoxication during which times suicidal thoughts intensify and he occasionally gives in to these impulses.  On the day of his arrival, he recalls consuming alcohol to the point of intoxication while sitting around with friends.  He and a friend were having a conversation about his ex-girlfriend and her infidelity during their relationship became a topic that was being discussed.  He felt as though he was being made fun of or ridiculed and his anxiety intensified giving rise to worsening depressive symptoms and eventually leading to suicidal thoughts.  He contemplated hanging himself; however, decided to call 911 in order to seek help instead.  He reports other psychosocial stressors involving a couple of legal issues stemming from a DWI and a past domestic assault charge.  He has also had some job related stressors and recently became unemployed.  He has been having some relationship difficulty with his mother and recently moved out of her home to move in with his father.  He tells me that his mother is now upset with him because she will no " longer be receiving the $600 a month he was paying her for rent to stay in her home.  He continues to feel quite depressed and harbors passive suicidal thoughts.  He is open to treatment interventions to improve the way he is feeling and functioning.      PSYCHIATRIC REVIEW OF SYSTEMS:  Regarding a depressive episode, mood has been depressed for greater than a 2-week period accompanied with low energy, low appetite, low concentration, anhedonia, feels helpless, hopeless.  Suicidal thoughts are present.  He is able to contract for safety on the unit.  Denies homicidal thoughts.  Denies symptoms of jay unrelated to substance usage.  Denied auditory hallucinations; however, did report occasionally seeing a shadow out of the corner of his eye, which has been occurring for as far as he can remember.  No paranoid delusions identified.  No thought insertion, thought extraction.  He identified himself as being an anxious person, excessively worrying in excess of his peers, which may occasionally interrupt his sleep and contribute to muscle tension and overall distress.  He tells me that he typically attributes anxiety to worsening his depressive symptoms.  No symptoms corresponding to a panic disorder, PTSD or an eating disorder.  Regarding symptoms of a borderline personality disorder, he described some fear of rejection, unstable interpersonal relationships, rapidly changing self-identity and self-worth, impulsive and risky behaviors, suicidal threats and behaviors, mood swings and ongoing feelings of emptiness.      PAST PSYCHIATRIC HISTORY:  He was able to identify at least 2 prior inpatient hospitalizations as an adolescent and 2 or 3 prior suicide attempts where he had attempted to hang himself.  His most serious attempt seemed to have occurred in May around the time of his birthday where he attempted to hang himself near his birthday; however a friend assisted in aborting the attempt.  He tells me he has not  complied with taking any psychotropic medications in the past; however, is now willing to do so.  He currently does not have any outpatient mental health providers.      SUBSTANCE ABUSE HISTORY:  Current drug of choice is alcohol, reporting that he typically drinks heavily on the weekends to the point of intoxication and blackout.  He may also drink an occasional day or 2 during the week depending on the circumstances.  He has experienced some withdrawal related to alcohol, describing mild shakes, fatigue and general malaise, not to the point of DTs or seizures.  He has been in treatment 1 time in the past.  He does have a DWI in 2016.  He previously used various illicit substances, frequenting amphetamines and cannabis with no recent usage reported.      PAST MEDICAL HISTORY:  No active issues.      FAMILY HISTORY:  Positive for depression and schizophrenia and chemical use.  No suicides in the family; however, he adds that a close friend completed suicide a few years ago and the anniversary date will be approaching soon.      SOCIAL HISTORY:  Refer to the psychosocial assessment completed by our .  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  He was expecting to start a new job on Monday; however, will not be doing so.  He had been residing with his mother and recently moved out of her home and is now staying with his father.  He reports having good rapport with his father.  He is single.  He does report a legal history involving probation for domestic assault 3 years ago and a DWI charge in 2016.      MEDICAL REVIEW OF SYSTEMS:  A 10-point systems were reviewed and were positive for psychiatric symptoms as noted above, otherwise negative.      PHYSICAL EXAMINATION:   VITAL SIGNS:  Blood pressure is 136/89, respirations 16, pulse 66, temperature 97.3, weight 121 pounds.   The rest of the physical examination  was reviewed in the emergency room documentation.      MENTAL STATUS EXAMINATION:  The patient appears his stated age, appropriately dressed, fair hygiene.  He was out in the common area watching television, accompanied me to the interview room.  He had several tattoos notable on his hand and his upper arms.  There was a piercing under his lip and plugs in his ears.  His hair was colored black mixed with another fluorescent color.  He was wearing hospital scrubs and sat in the chair comfortably, no psychomotor abnormalities.  Eye contact was fair.  Speech was normal, not pushed or pressured.  Mood is described as depressed and affect was blunted.  Thought process was linear, logical, future oriented.  Thought content did not display evidence of psychosis.  He endorses suicidal thoughts, denied homicidal thoughts.  Associations were intact.  Insight and judgment appear fair.  Cognition appears intact to interviewing including orientation to person, place, time and situation, use of language, fund of knowledge, recent and remote memory.  Muscle strength, tone and gait appear normal on visual inspection.      ASSESSMENT:   1.  Major depressive disorder, recurrent, severe.   2.  Generalized anxiety disorder.   3.  Borderline personality disorder traits.   4.  Alcohol use disorder, moderate.   5.  Stimulant use disorder, amphetamine type substance, in remission.      PLAN:   1.  The patient has been admitted to our Behavioral Health unit voluntarily for reports of depressed mood and suicidal thoughts.  Treatment will be continued voluntarily.   2.  He is agreeable to start antidepressant medications.  We reviewed starting Lexapro at 10 mg daily to address mood and anxiety symptoms.  Risks, benefits and side effects were reviewed and the patient consents to treatment as outlined in this document.  We will continue [facility] protocol to safely detox from alcohol, noting that he received Valium this morning.   3.  Psychosocial  treatments to be addressed with social work consult and groups, noting some personality characteristics which may be complicating his treatment course and symptomology.  Neuropsychological testing was recommended and the patient was agreeable.  I will place a psychology consult request to further facilitate this.  He would benefit from a referral to an outpatient therapist and a prescriber for medication management.   4.  Anticipated hospital stay of approximately 1 week as we target improvement in mood and remission of suicidal thoughts.         BACILIO MEJIA MD             D: 09/10/2017 11:18   T: 09/10/2017 11:51   MT: ANTWON      Name:     KARI WILSON   MRN:      [id number]        Account:      [id number]   :      1995           Admitted:     [id number]      Document: [id number]

## 2017-09-10 NOTE — PROGRESS NOTES
"Pt woke early, ate breakfast, and participated in community meeting.  Pt stated that he felt anxious and was beginning to feel the impacts of withdrawal.  PT was encouraged to speak to his nurse.  Pt was observed conversing with nurse.  Later patient stated that the interventions available to him were helpful.    Pt stated that he felt like harming himself this past Friday but not since.  Pt stated that this was due to lack of opportunity regarding work, not having a car, moving out of his mother's house, and otherwise experiencing a perceived lack of opportunity or social support.     When asked if he felt safe on the unit pt responded, \"I guess.\"  Pt stated that he never had a plan to harm himself but thoughts only.  Pt denied thoughts currently.       09/10/17 1200   Behavioral Health   Hallucinations denies / not responding to hallucinations   Thinking poor concentration   Orientation place: oriented;date: oriented;time: oriented   Memory baseline memory   Insight insight appropriate to situation   Judgement impaired   Eye Contact at examiner   Affect blunted, flat   Mood depressed;hopeless;anxious   Physical Appearance/Attire posture slouched;disheveled   Hygiene neglected grooming - unclean body, hair, teeth   Suicidality (\"not since Friday.\")   Self Injury (\"not since Friday.\")   Elopement (no behaviors observed)   Activity withdrawn   Speech clear   Medication Sensitivity no stated side effects;no observed side effects   Psychomotor / Gait slow;steady   Psycho Education   Type of Intervention 1:1 intervention   Response participates, initiates socially appropriate   Hours 0.5   Activities of Daily Living   Hygiene/Grooming independent   Oral Hygiene independent   Dress scrubs (behavioral health)   Room Organization independent   Behavioral Health Interventions   Depression maintain safety precautions   Social and Therapeutic Interventions (Depression) encourage socialization with peers     "

## 2017-09-10 NOTE — PROGRESS NOTES
"Pt was in the milieu the majority of the shift.  Pt participated in community meeting.    Pt spent time working on testing materials and watching football.  Pt stated that he was feeling \"much better\" than earlier in the day or earlier in the week.  Pt stated that he feels safe on the unit and is motivated to complete the testing requested of him.      Pt stated that he was able to come to staff with any needs that arose.  Pt showered later in the shift and took initiative concerning his grooming and care.       09/10/17 1800   Behavioral Health   Hallucinations denies / not responding to hallucinations   Thinking intact   Orientation person: oriented;place: oriented;date: oriented   Memory baseline memory   Insight insight appropriate to situation   Judgement impaired   Eye Contact at examiner   Affect blunted, flat   Mood mood is calm   Physical Appearance/Attire appears stated age   Hygiene other (see comment)  (plans to shower later in the evening)   Suicidality (denies)   Self Injury (denies)   Elopement (none observed)   Activity other (see comment)  (in milieu)   Speech clear;coherent   Medication Sensitivity no stated side effects;no observed side effects   Psychomotor / Gait balanced;steady   Psycho Education   Type of Intervention 1:1 intervention   Response participates, initiates socially appropriate   Hours 0.5   Activities of Daily Living   Hygiene/Grooming independent   Oral Hygiene independent   Dress scrubs (behavioral health)   Room Organization independent   Behavioral Health Interventions   Depression maintain safety precautions   Social and Therapeutic Interventions (Depression) encourage participation in therapeutic groups and milieu activities     "

## 2017-09-11 PROCEDURE — 25000132 ZZH RX MED GY IP 250 OP 250 PS 637: Performed by: PSYCHIATRY & NEUROLOGY

## 2017-09-11 PROCEDURE — 12400008 ZZH R&B MH INTERMEDIATE ADOLESCENT

## 2017-09-11 PROCEDURE — 99232 SBSQ HOSP IP/OBS MODERATE 35: CPT | Performed by: CLINICAL NURSE SPECIALIST

## 2017-09-11 RX ADMIN — Medication 100 MG: at 08:59

## 2017-09-11 RX ADMIN — FOLIC ACID 1 MG: 1 TABLET ORAL at 08:59

## 2017-09-11 RX ADMIN — MULTIPLE VITAMINS W/ MINERALS TAB 1 TABLET: TAB at 08:59

## 2017-09-11 RX ADMIN — NICOTINE 1 PATCH: 14 PATCH, EXTENDED RELEASE TRANSDERMAL at 09:00

## 2017-09-11 RX ADMIN — TRAZODONE HYDROCHLORIDE 50 MG: 50 TABLET ORAL at 21:17

## 2017-09-11 RX ADMIN — ESCITALOPRAM OXALATE 10 MG: 10 TABLET ORAL at 08:59

## 2017-09-11 ASSESSMENT — ACTIVITIES OF DAILY LIVING (ADL)
ORAL_HYGIENE: INDEPENDENT
DRESS: INDEPENDENT
GROOMING: INDEPENDENT

## 2017-09-11 NOTE — PROGRESS NOTES
Writer unable to meet with patient by end of shift. Discussed with provider,writer will contact pt's dad for collateral information.  RAYRAY signed for Kyle Cain [phone].

## 2017-09-11 NOTE — PROGRESS NOTES
"[facility] [place]   Psychiatric Progress Note        Interim History:   The patient's care was discussed with the treatment team during the daily team meeting and/or staff's chart notes were reviewed.  Staff report patient completed the psychological testing.     discussed CD treatment with CTC. Will discussed with patient if he will consider treatment. He continues to appear depressed. Suggested patient used Trazodone for sleep.         Medications:       escitalopram  10 mg Oral Daily     nicotine   Transdermal Q8H     nicotine   Transdermal Daily     nicotine  1 patch Transdermal Daily     thiamine  100 mg Oral Daily     folic acid  1 mg Oral Daily     multivitamin, therapeutic with minerals  1 tablet Oral Daily          Allergies:     Allergies   Allergen Reactions     Animal Dander      Other [Seasonal Allergies]           Labs:   No results found for this or any previous visit (from the past 24 hour(s)).       Psychiatric Examination:     /90  Pulse 63  Temp 99  F (37.2  C) (Oral)  Resp 16  Ht 1.727 m (5' 8\")  Wt 54.4 kg (120 lb)  SpO2 98%  BMI 18.25 kg/m2  Weight is 120 lbs 0 oz  Body mass index is 18.25 kg/(m^2).  Orthostatic Vitals       Most Recent      Sitting Orthostatic /82 09/10 1100    Sitting Orthostatic Pulse (bpm) 62 09/10 1100    Standing Orthostatic /78 09/10 1100    Standing Orthostatic Pulse (bpm) 97 09/10 1100            Appearance: awake, alert, dressed in hospital scrubs and fatigued  Attitude:  guarded  Eye Contact:  poor   Mood:  depressed  Affect:  intensity is blunted  Speech:  normal prosody  Psychomotor Behavior:  no evidence of tardive dyskinesia, dystonia, or tics  Throught Process:  linear  Associations:  no loose associations  Thought Content:  passive suicidal ideation present  Insight:  fair  Judgement:  fair  Oriented to:  time, person, and place  Attention Span and Concentration:  fair  Recent and Remote Memory:  " intact         Precautions:     Behavioral Orders   Procedures     Code 1 - Restrict to Unit     Meli     MMPI 2     Routine Programming     As clinically indicated     Seizure precautions     Status 15     Every 15 minutes.     Suicide precautions     Withdrawal precautions          DIagnoses:   1.  Major depressive disorder, recurrent, severe.   2.  Generalized anxiety disorder.   3.  Borderline personality disorder traits.   4.  Alcohol use disorder, moderate.   5.  Stimulant use disorder, amphetamine type substance, in remission.           Plan:   Legal: voluntary    Medication Management: Lexapro 10 mg. Denies any side effects     Dipso: Patient has been sleeping most of the day. He reports he is having trouble sleeping. He is reporting he continues to feels depressed. He presents with a flat affect. He give minimal answers to questions today. Patient has very low scores an has detoxed.DC'ed MSSA protocol.

## 2017-09-11 NOTE — PLAN OF CARE
"Problem: Depressive Symptoms  Goal: Depressive Symptoms  Signs and symptoms of listed problems will be absent or manageable.   Patient, prior to discharge, will:  -verbalize decrease in depressive signs/symptoms  -verbalize a decrease in anxiety   -verbalize an understanding of medication regimen   -verbalize absence of SI/SIB   -develop a safety plan  -identify a support system   -will participate in coordination of discharge planning  Outcome: Therapy, progress toward functional goals as expected  RN48/     Patient MSSA (PCS) monitored (3 @ 1000 and has been scoring very low since admission with no medications given associated with substance withdrawal.Patient is hopeful stating \"I hope to get my days and nights back to normal\" and stated GOAL is \"to sleep better during the night.\"     Patient is cooperative and calm, appearsblunted and Appropriate to situation . Patient is med-compliant, is eating 75% and reports \"having difficulty sleeping\". Patient is selectively attending groups, yet has been up for meals.     Patient denies any SI/SIB or HI; no Aggression observed.     VS reviewed: /90  Pulse 63  Temp 98.3  F (36.8  C) (Oral)  Resp 16  Ht 1.727 m (5' 8\")  Wt 54.4 kg (120 lb)  SpO2 98%  BMI 18.25 kg/m2 . Patient denies  pain.     Length of stay: 2     Patient evaluation continues. Assessed mood,anxiety,thoughts and behavior and withdrawal process. Patientis progressing towards goals. Patient is encouraged to participate in groups and assisted to develop healthy coping skills.      Refer to daily team meeting notes for individualized plan of care. Nursing will continue to monitor.      * Scale is offered as scale of 1 to 10 with 10 being the worst.           "

## 2017-09-12 PROCEDURE — 25000132 ZZH RX MED GY IP 250 OP 250 PS 637: Performed by: PSYCHIATRY & NEUROLOGY

## 2017-09-12 PROCEDURE — 99232 SBSQ HOSP IP/OBS MODERATE 35: CPT | Performed by: CLINICAL NURSE SPECIALIST

## 2017-09-12 PROCEDURE — 96103 ZZHC PSYCH TEST BY COMP, MCMI-3 PROFILE: CPT

## 2017-09-12 PROCEDURE — 96103 ZZHC PSYCH TEST BY COMP, MMPI-2 PROFILE: CPT

## 2017-09-12 PROCEDURE — H2032 ACTIVITY THERAPY, PER 15 MIN: HCPCS

## 2017-09-12 PROCEDURE — 12400008 ZZH R&B MH INTERMEDIATE ADOLESCENT

## 2017-09-12 RX ADMIN — MULTIPLE VITAMINS W/ MINERALS TAB 1 TABLET: TAB at 08:15

## 2017-09-12 RX ADMIN — ALBUTEROL SULFATE 2 PUFF: 90 AEROSOL, METERED RESPIRATORY (INHALATION) at 08:41

## 2017-09-12 RX ADMIN — FOLIC ACID 1 MG: 1 TABLET ORAL at 08:15

## 2017-09-12 RX ADMIN — HYDROXYZINE HYDROCHLORIDE 50 MG: 25 TABLET ORAL at 09:18

## 2017-09-12 RX ADMIN — TRAZODONE HYDROCHLORIDE 50 MG: 50 TABLET ORAL at 21:02

## 2017-09-12 RX ADMIN — ESCITALOPRAM OXALATE 10 MG: 10 TABLET ORAL at 08:15

## 2017-09-12 RX ADMIN — NICOTINE 1 PATCH: 14 PATCH, EXTENDED RELEASE TRANSDERMAL at 08:15

## 2017-09-12 ASSESSMENT — ACTIVITIES OF DAILY LIVING (ADL)
ORAL_HYGIENE: INDEPENDENT
DRESS: INDEPENDENT
ORAL_HYGIENE: INDEPENDENT
GROOMING: INDEPENDENT
DRESS: INDEPENDENT
GROOMING: INDEPENDENT

## 2017-09-12 NOTE — PROGRESS NOTES
"[facility], [place]   Psychiatric Progress Note        Interim History:   The patient's care was discussed with the treatment team during the daily team meeting and/or staff's chart notes were reviewed.  Staff report patient was more visible in the milieu and active.     Patient reports feeling better today because he was able to get more sleep last night. He feels Trazodone id effective for him. He is reporting feeling better since starting Lexapro. He wants to go to therapy after discharge to address his anxiety due to his life stressors.          Medications:       escitalopram  10 mg Oral Daily     nicotine   Transdermal Q8H     nicotine   Transdermal Daily     nicotine  1 patch Transdermal Daily     folic acid  1 mg Oral Daily     multivitamin, therapeutic with minerals  1 tablet Oral Daily          Allergies:     Allergies   Allergen Reactions     Animal Dander      Other [Seasonal Allergies]           Labs:   No results found for this or any previous visit (from the past 24 hour(s)).       Psychiatric Examination:     /90  Pulse 63  Temp 98.3  F (36.8  C) (Oral)  Resp 16  Ht 1.727 m (5' 8\")  Wt 54.4 kg (120 lb)  SpO2 98%  BMI 18.25 kg/m2  Weight is 120 lbs 0 oz  Body mass index is 18.25 kg/(m^2).  Orthostatic Vitals       Most Recent      Sitting Orthostatic /86 09/11 0900    Sitting Orthostatic Pulse (bpm) 62 09/11 0900    Standing Orthostatic /95 09/11 0900    Standing Orthostatic Pulse (bpm) 76 09/11 0900            Appearance: awake, alert, adequately groomed and dressed in hospital scrubs  Attitude:  cooperative  Eye Contact:  good  Mood:  better  Affect:  intensity is blunted  Speech:  clear, coherent  Psychomotor Behavior:  no evidence of tardive dyskinesia, dystonia, or tics  Throught Process:  logical, linear and goal oriented  Associations:  no loose associations  Thought Content:  no evidence of suicidal ideation or homicidal ideation  Insight:  " fair  Judgement:  fair  Oriented to:  time, person, and place  Attention Span and Concentration:  intact  Recent and Remote Memory:  intact         Precautions:     Behavioral Orders   Procedures     Code 1 - Restrict to Unit     Meli     MMPI 2     Routine Programming     As clinically indicated     Seizure precautions     Status 15     Every 15 minutes.     Suicide precautions          DIagnoses:   1.  Major depressive disorder, recurrent, severe.   2.  Generalized anxiety disorder.   3.  Borderline personality disorder traits.   4.  Alcohol use disorder, moderate.   5.  Stimulant use disorder, amphetamine type substance, in remission.             Plan:   Legal: voluntary    Medication management: Patient reports Trazodone was helpful for sleep. He denies any side effects from Lexapro.     Dispo: Patient will be discharged to home with his father. He does not feel he needs CD treatment and will follow up with AA meetings.

## 2017-09-12 NOTE — PROGRESS NOTES
Writer met with patient to discuss d/c plans. Patient wants a psychiatrist, therapist and resources for AA. No other concerns.

## 2017-09-12 NOTE — PROGRESS NOTES
09/12/17 1800   General Information   Art Directive house-tree-person   Task Orientation    Task orientation skills follows instruction;works independently;takes time to complete tasks;sustains involvement   Social Interaction   Social interaction skills shares appropriately;responds to limits ;active participation;responds to therapist   Product/Content   Product/Content image reflects current feelings;spontaneous and free image;has own expressive language;presence of a metaphor/theme   Developmental level   Approximate developmental level of art expression age appropriate expression;age appropriate motor skills   Pt attended morning group and worked on his assessment drawing.   He asked if he could do his drawing with multiple persons doing something instead of one person doing something which is the directive. Writer told his yes if that is his idea.  In his house, lives a matt and a girl. The best part of the house is the ninja on top of the house, his least favorite part is the door.   Tree- is 1,000,000 years old, he likes that there are ninjas in the tree, he doesn't like how big it is, the tree needs birds the most.  The person is in his 20s, on a picnic, he is happy. He likes the ninjas in the picture, he doesn't like the stick figures, and the person most needs food. His descriptions were short and simple, the drawing was complex , even though the figures were stick figures.    Pt was cooperative and pleasant in morning group.

## 2017-09-12 NOTE — PROGRESS NOTES
09/12/17 [facility]   Behavioral Health   Hallucinations denies / not responding to hallucinations   Thinking intact   Orientation person: oriented;place: oriented;date: oriented;time: oriented   Memory baseline memory   Insight insight appropriate to situation   Judgement intact   Eye Contact at examiner   Affect full range affect   Mood mood is calm   Physical Appearance/Attire attire appropriate to age and situation   Hygiene well groomed   Suicidality other (see comments)  (denies)   Self Injury other (see comment)  (denies)   Elopement (none stated or observed)   Activity other (see comment)  (active)   Speech clear;coherent   Medication Sensitivity no stated side effects;no observed side effects   Psychomotor / Gait balanced;steady   Activities of Daily Living   Hygiene/Grooming independent   Oral Hygiene independent   Dress independent   Room Organization independent   Behavioral Health Interventions   Depression assist patient in developing safety plan;assist patient in following safety plan;encourage nutrition and hydration;encourage participation / independence with adls;provide emotional support;establish therapeutic relationship;assist with developing and utilizing healthy coping strategies;build upon strengths   Social and Therapeutic Interventions (Depression) encourage socialization with peers;encourage effective boundaries with peers;encourage participation in therapeutic groups and milieu activities     Pt was calm, pleasant, and approachable. Pt was social with others. Pt attended and participated in select groups. Pt is on board with after care plan. Pt is feeling ready for DC tomorrow. Pt denied SI and SIB. Pt denied hallucinations/psychotic symptoms.

## 2017-09-13 VITALS
HEIGHT: 68 IN | HEART RATE: 71 BPM | DIASTOLIC BLOOD PRESSURE: 78 MMHG | OXYGEN SATURATION: 98 % | WEIGHT: 121.7 LBS | RESPIRATION RATE: 16 BRPM | BODY MASS INDEX: 18.44 KG/M2 | TEMPERATURE: 97.8 F | SYSTOLIC BLOOD PRESSURE: 136 MMHG

## 2017-09-13 PROCEDURE — 99239 HOSP IP/OBS DSCHRG MGMT >30: CPT | Performed by: CLINICAL NURSE SPECIALIST

## 2017-09-13 PROCEDURE — 25000132 ZZH RX MED GY IP 250 OP 250 PS 637: Performed by: PSYCHIATRY & NEUROLOGY

## 2017-09-13 PROCEDURE — H2032 ACTIVITY THERAPY, PER 15 MIN: HCPCS

## 2017-09-13 RX ORDER — HYDROXYZINE HYDROCHLORIDE 25 MG/1
25-50 TABLET, FILM COATED ORAL EVERY 4 HOURS PRN
Qty: 120 TABLET | Refills: 1 | Status: ON HOLD | OUTPATIENT
Start: 2017-09-13 | End: 2018-08-24

## 2017-09-13 RX ORDER — ESCITALOPRAM OXALATE 10 MG/1
10 TABLET ORAL DAILY
Qty: 30 TABLET | Refills: 1 | Status: ON HOLD | OUTPATIENT
Start: 2017-09-13 | End: 2018-08-24

## 2017-09-13 RX ORDER — TRAZODONE HYDROCHLORIDE 50 MG/1
50 TABLET, FILM COATED ORAL
Qty: 90 TABLET | Refills: 1 | Status: SHIPPED | OUTPATIENT
Start: 2017-09-13 | End: 2018-08-16

## 2017-09-13 RX ADMIN — FOLIC ACID 1 MG: 1 TABLET ORAL at 08:41

## 2017-09-13 RX ADMIN — NICOTINE 1 PATCH: 14 PATCH, EXTENDED RELEASE TRANSDERMAL at 08:41

## 2017-09-13 RX ADMIN — MULTIPLE VITAMINS W/ MINERALS TAB 1 TABLET: TAB at 08:41

## 2017-09-13 RX ADMIN — ESCITALOPRAM OXALATE 10 MG: 10 TABLET ORAL at 08:40

## 2017-09-13 ASSESSMENT — ACTIVITIES OF DAILY LIVING (ADL): GROOMING: INDEPENDENT

## 2017-09-13 NOTE — PROGRESS NOTES
"   09/12/17 2138   Behavioral Health   Hallucinations denies / not responding to hallucinations   Thinking intact   Orientation person: oriented;place: oriented;date: oriented;time: oriented   Memory baseline memory   Insight insight appropriate to situation   Judgement intact   Eye Contact at examiner   Affect full range affect   Mood mood is calm   Physical Appearance/Attire appears stated age   Hygiene well groomed   Suicidality other (see comments)  (denies)   Self Injury other (see comment)  (denies)   Elopement (no concerns noted)   Activity other (see comment)  (visible in milieu)   Speech clear;coherent   Medication Sensitivity no stated side effects   Psychomotor / Gait balanced;steady     Abdulaziz has been visible in the milieu the entire evening.  He went to community meeting and watched movies with peers.  He stated that he doesn't have any thoughts of SI/SIB \"now\" but they come and go.  He has a plan to discharge tomorrow evening around 6 or 7 pm.  He will go to therapy 2 times per month, attend AA meetings, and continue to take medications.  He did not state any other concerns.  "

## 2017-09-13 NOTE — PROGRESS NOTES
Patient acknowledged understanding of discharge instructions and follow-up.  Instructed to call and reschedule medication management.  Awaiting transportation by his father.

## 2017-09-13 NOTE — DISCHARGE SUMMARY
Psychiatric Discharge Summary    Abdulaziz Mart MRN# [id number]   Age: 22 year old YOB: 1995     Date of Admission:  9/9/2017  Date of Discharge:  9/13/2017  Admitting Physician:  Agapito Duarte MD  Discharge Physician:  Debra A. Naegele, APRN CNS (Contact: [phone])         Event Leading to Hospitalization:   The patient has a history of major depressive disorder and chemical addiction with prominent usage of alcohol.  He presented voluntarily to the emergency room after he called 911 to report depressed mood and suicidal thoughts.  In the emergency room, his alcohol breath test was 0.222 on arrival with his urine drug screen negative for other illicit substances.  He was admitted voluntarily.  On examination today, he reports that over the past several years he has felt quite depressed, recalling a prior diagnosis of dysthymia.  He was able to identify a pattern of worsening depressive symptoms, typically in the setting of alcohol intoxication during which times suicidal thoughts intensify and he occasionally gives in to these impulses.  On the day of his arrival, he recalls consuming alcohol to the point of intoxication while sitting around with friends.  He and a friend were having a conversation about his ex-girlfriend and her infidelity during their relationship became a topic that was being discussed.  He felt as though he was being made fun of or ridiculed and his anxiety intensified giving rise to worsening depressive symptoms and eventually leading to suicidal thoughts.  He contemplated hanging himself; however, decided to call 911 in order to seek help instead.  He reports other psychosocial stressors involving a couple of legal issues stemming from a DWI and a past domestic assault charge.  He has also had some job related stressors and recently became unemployed.  He has been having some relationship difficulty with his mother and recently moved out of her home to move in with his  father.  He tells me that his mother is now upset with him because she will no longer be receiving the $600 a month he was paying her for rent to stay in her home.  He continues to feel quite depressed and harbors passive suicidal thoughts.  He is open to treatment interventions to improve the way he is feeling and functioning       See Admission note by Reid Zaldivar MD on 9/10/2017 for additional details.          DIagnoses:   1.  Major depressive disorder, recurrent, severe.   2.  Generalized anxiety disorder.   3.  Borderline personality disorder traits.   4.  Alcohol use disorder, moderate.   5.  Stimulant use disorder, amphetamine type substance, in remission.           Labs:     Results for orders placed or performed during the hospital encounter of 09/09/17   Drug abuse screen 6 urine (chem dep)   Result Value Ref Range    Amphetamine Qual Urine Negative NEG^Negative    Barbiturates Qual Urine Negative NEG^Negative    Benzodiazepine Qual Urine Negative NEG^Negative    Cannabinoids Qual Urine Negative NEG^Negative    Cocaine Qual Urine Negative NEG^Negative    Ethanol Qual Urine Positive (A) NEG^Negative    Opiates Qualitative Urine Negative NEG^Negative   CBC with platelets differential   Result Value Ref Range    WBC 4.6 4.0 - 11.0 10e9/L    RBC Count 4.77 4.4 - 5.9 10e12/L    Hemoglobin 14.8 13.3 - 17.7 g/dL    Hematocrit 44.1 40.0 - 53.0 %    MCV 93 78 - 100 fl    MCH 31.0 26.5 - 33.0 pg    MCHC 33.6 31.5 - 36.5 g/dL    RDW 11.8 10.0 - 15.0 %    Platelet Count 196 150 - 450 10e9/L    Diff Method Automated Method     % Neutrophils 38.4 %    % Lymphocytes 44.8 %    % Monocytes 11.1 %    % Eosinophils 4.8 %    % Basophils 0.7 %    % Immature Granulocytes 0.2 %    Nucleated RBCs 0 0 /100    Absolute Neutrophil 1.8 1.6 - 8.3 10e9/L    Absolute Lymphocytes 2.1 0.8 - 5.3 10e9/L    Absolute Monocytes 0.5 0.0 - 1.3 10e9/L    Absolute Eosinophils 0.2 0.0 - 0.7 10e9/L    Absolute Basophils 0.0 0.0 - 0.2 10e9/L     Abs Immature Granulocytes 0.0 0 - 0.4 10e9/L    Absolute Nucleated RBC 0.0    Comprehensive metabolic panel   Result Value Ref Range    Sodium 141 133 - 144 mmol/L    Potassium 3.6 3.4 - 5.3 mmol/L    Chloride 107 94 - 109 mmol/L    Carbon Dioxide 24 20 - 32 mmol/L    Anion Gap 10 3 - 14 mmol/L    Glucose 80 70 - 99 mg/dL    Urea Nitrogen 12 7 - 30 mg/dL    Creatinine 0.88 0.66 - 1.25 mg/dL    GFR Estimate >90 >60 mL/min/1.7m2    GFR Estimate If Black >90 >60 mL/min/1.7m2    Calcium 9.0 8.5 - 10.1 mg/dL    Bilirubin Total 1.0 0.2 - 1.3 mg/dL    Albumin 3.7 3.4 - 5.0 g/dL    Protein Total 7.5 6.8 - 8.8 g/dL    Alkaline Phosphatase 90 40 - 150 U/L    ALT 21 0 - 70 U/L    AST 18 0 - 45 U/L   Lipid panel   Result Value Ref Range    Cholesterol 163 <200 mg/dL    Triglycerides 83 <150 mg/dL    HDL Cholesterol 78 >39 mg/dL    LDL Cholesterol Calculated 68 <100 mg/dL    Non HDL Cholesterol 85 <130 mg/dL   TSH with free T4 reflex and/or T3 as indicated   Result Value Ref Range    TSH 2.65 0.40 - 4.00 mU/L   Alcohol breath test POCT   Result Value Ref Range    Alcohol Breath Test 0.222 (A) 0.00 - 0.01   Alcohol breath test POCT   Result Value Ref Range    Alcohol Breath Test 0.135 (A) 0.00 - 0.01            Consults:   Psychological testing completed.         Hospital Course:   Abdulaziz Mart was admitted to [facility] currently residing on 7A with attending Agapito Duarte MD as a voluntary patient. The patient was placed under status 15 (15 minute checks) to ensure patient safety.     Patient was admitted for suicidal ideation. He was started on Lexapro 10 mg which patient felt is effective. He used hydroxyzine for his anxiety and trazodone for sleep. Cautioned patient not to take hydroxyzine prior to driving due to possible drowsiness.     Patient was put on the [facility] protocol to safely detox from his alcohol use. Patient had minimal withdrawal symptoms. Patient was given education regarding the use of alcohol with his  prescribed medication. Dicussed how alcohol is a depressant and has detrimental effects of mood and reduces the efficacy of his prescribed medication. May even be a precipitant to suicidal thoughts. Recommendation is to abstain from all mood altering substances.     Abdulaziz Mart did participate in groups and was visible in the milieu. The patient's symptoms of suicidal ideation improved. Patient presents with a stable mood and denies any suicidal thinking. He has protective factors of supportive family. He will be going to AA meetings to maintain his sobriety. Patient does not met criteria for hospitalization. He is at moderate risk for relapse due to his psychiatric and chemical dependency history.    Abdulaziz Mart was released to home to live with father.. At the time of discharge Abdulaziz Mart was determined to not be a danger to himself or others.          Discharge Medications:     Current Discharge Medication List      START taking these medications    Details   hydrOXYzine (ATARAX) 25 MG tablet Take 1-2 tablets (25-50 mg) by mouth every 4 hours as needed for anxiety  Qty: 120 tablet, Refills: 1    Associated Diagnoses: Anxiety      escitalopram (LEXAPRO) 10 MG tablet Take 1 tablet (10 mg) by mouth daily  Qty: 30 tablet, Refills: 1    Associated Diagnoses: Depression, unspecified depression type      traZODone (DESYREL) 50 MG tablet Take 1 tablet (50 mg) by mouth nightly as needed for sleep  Qty: 90 tablet, Refills: 1    Associated Diagnoses: Insomnia due to other mental disorder         CONTINUE these medications which have NOT CHANGED    Details   albuterol (PROVENTIL HFA: VENTOLIN HFA) 108 (90 BASE) MCG/ACT inhaler Inhale 2 puffs into the lungs every 4 hours as needed.  Qty: 1 Inhaler, Refills: 0    Associated Diagnoses: Mild persistent asthma                  Psychiatric Examination:   Appearance:  awake, alert and adequately groomed  Attitude:  cooperative  Eye Contact:  good  Mood:  good  Affect:   appropriate and in normal range  Speech:  clear, coherent  Psychomotor Behavior:  no evidence of tardive dyskinesia, dystonia, or tics  Thought Process:  logical, linear and goal oriented  Associations:  no loose associations  Thought Content:  no evidence of suicidal ideation or homicidal ideation  Insight:  fair  Judgment:  fair  Oriented to:  time, person, and place  Attention Span and Concentration:  intact  Recent and Remote Memory:  intact  Language: Able to name objects, Able to repeat phrases and Able to read and write  Fund of Knowledge: adequate  Muscle Strength and Tone: normal  Gait and Station: Normal         Discharge Plan:   Summary: You were admitted on 9/9/2017 to [facility] for suicidal ideation.  You were treated by Debra Naegele, APRN, CNP and discharged on 9/13/17   to Home     Health Care Follow-up Appointments:   Medication Management  Date: Thursday, September 14, 2017  Time: 12pm. Check in at 1130am      Provider: Gaby Robert via Tele Psychiatry  Address: [address] [place] [address]. [place], [place]   Phone:[phone]   [facility] has faxed the Discharge Summary and AVS to this provider at Fax: [phone]       Therapy Appointment   Date: Wednesday, September 27, 2017  Time: 11am      Provider: Liborio Cornejo  Address: [address] [place] [address]  Phone:[phone]       AA Resources   Go to [email or web address].org to find the AA meeting closest to you  Alcoholics Anonymous ([email or web address]-anonymous.org)     Attend all scheduled appointments with your outpatient providers. Call at least 24 hours in advance if you need to reschedule an appointment to ensure continued access to your outpatient providers.   Major Treatments, Procedures and Findings: You were provided with: a psychiatric assessment, assessed for medical stability, medication evaluation and/or management and group therapy     Symptoms to Report: feeling more aggressive, increased confusion, losing more sleep, mood getting  worse or thoughts of suicide     Early warning signs can include:  increased depression or anxiety sleep disturbances increased thoughts or behaviors of suicide or self-harm      Safety and Wellness:  Take all medicines as directed.  Make no changes unless your doctor suggests them.      Follow treatment recommendations.  Refrain from alcohol and non-prescribed drugs.  If there is a concern for safety, call 911.     Resources: Mental health crisis response for your [facility] is offered 24 hours a day, 7 days a week. A trained counselor will assess your current situation, offer support and counseling and connect you with local resources.    [facility] Crisis Response [phone]     The treatment team has appreciated the opportunity to work with you. Drea, please take care and make your recovery a daily recovery. If you have any questions or concerns our unit number is [phone].  You will be receiving a follow-up phone call within the next three days from a representative from behavioral health.  You have identified the best phone number to reach you as [phone]   Attestation:  The patient has been seen and evaluated by me,  Debra A. Naegele, APRN CNS on 9/13/2017  Discharge summary time > 30 minutes

## 2017-09-13 NOTE — PROGRESS NOTES
Discharge orders is received.  Patient reviewed discharge instructions and signed discharge forms with day shift nurse. Patient continues to denies SI or SIB.  Patient reports he is ready to discharge to home.  Belongings are reviewed with patient.  Patients father arrived.  Patient is given belongings and home medications.  Patient discharges to home with father.

## 2017-09-13 NOTE — DISCHARGE INSTRUCTIONS
Behavioral Discharge Planning and Instructions      Summary: You were admitted on 9/9/2017 to [facility] for suicidal ideation.  You were treated by Debra Naegele, APRN, CNP and discharged on 9/13/17   to Home    Health Care Follow-up Appointments:   Medication Management  Date: Thursday, September 14, 2017  Time: 12pm. Check in at 1130am      Provider: Gaby Robert via Tele Psychiatry  Address: [address] [place] [address]. [place], [place]   Phone:[phone]   [facility] [place] the Discharge Summary and AVS to this provider at Fax: [phone]      Therapy Appointment   Date: Wednesday, September 27, 2017  Time: 11am      Provider: Liborio Cornejo  Address: [address] [place] [address]  Phone:[phone]      AA Resources   Go to [email or web address] to find the AA meeting closest to you  Alcoholics Anonymous (www.alcoholics-anonymous.org)    Attend all scheduled appointments with your outpatient providers. Call at least 24 hours in advance if you need to reschedule an appointment to ensure continued access to your outpatient providers.   Major Treatments, Procedures and Findings: You were provided with: a psychiatric assessment, assessed for medical stability, medication evaluation and/or management and group therapy    Symptoms to Report: feeling more aggressive, increased confusion, losing more sleep, mood getting worse or thoughts of suicide    Early warning signs can include:  increased depression or anxiety sleep disturbances increased thoughts or behaviors of suicide or self-harm     Safety and Wellness:  Take all medicines as directed.  Make no changes unless your doctor suggests them.      Follow treatment recommendations.  Refrain from alcohol and non-prescribed drugs.  If there is a concern for safety, call 911.    Resources: Mental health crisis response for your [facility] is offered 24 hours a day, 7 days a week. A trained counselor will assess your current situation, offer support and counseling  and connect you with local resources.    [facility] Crisis Response [phone]    The treatment team has appreciated the opportunity to work with you. Drea, please take care and make your recovery a daily recovery. If you have any questions or concerns our unit number is [phone].  You will be receiving a follow-up phone call within the next three days from a representative from behavioral health.  You have identified the best phone number to reach you as [phone]
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
## 2017-09-13 NOTE — PLAN OF CARE
Problem: Depressive Symptoms  Goal: Depressive Symptoms  Signs and symptoms of listed problems will be absent or manageable.   Patient, prior to discharge, will:  -verbalize decrease in depressive signs/symptoms  -verbalize a decrease in anxiety   -verbalize an understanding of medication regimen   -verbalize absence of SI/SIB   -develop a safety plan  -identify a support system   -will participate in coordination of discharge planning     Outcome: Therapy, progress toward functional goals as expected  Patient has been up and visible in the milieu.  Full range affect.  Social, attended unit programming.  Denies MH concerns--denies depression, anxiety.  Denies SI/SIB or thoughts to hurt others. Denies hallucinations.  Thinking is linear and organized.  Plans to stay with his with his father.  Feels that he will be able to maintain sobriety by attending AA.  Does have a therapist appointment.  Looking forward to discharge.

## 2018-08-15 ENCOUNTER — HOSPITAL ENCOUNTER (INPATIENT)
Facility: CLINIC | Age: 23
LOS: 13 days | Discharge: PSYCHIATRIC OR SUBSTANCE USE FACILITY | End: 2018-08-29
Attending: FAMILY MEDICINE | Admitting: PSYCHIATRY & NEUROLOGY
Payer: COMMERCIAL

## 2018-08-15 DIAGNOSIS — R45.851 SUICIDAL IDEATION: ICD-10-CM

## 2018-08-15 DIAGNOSIS — F32.89 OTHER DEPRESSION: ICD-10-CM

## 2018-08-15 DIAGNOSIS — F10.929 ALCOHOLIC INTOXICATION WITH COMPLICATION (H): ICD-10-CM

## 2018-08-15 DIAGNOSIS — F43.22 ADJUSTMENT DISORDER WITH ANXIOUS MOOD: ICD-10-CM

## 2018-08-15 DIAGNOSIS — J45.30 MILD PERSISTENT ASTHMA WITHOUT COMPLICATION: Primary | ICD-10-CM

## 2018-08-15 DIAGNOSIS — L30.8 OTHER ECZEMA: ICD-10-CM

## 2018-08-15 LAB — ALCOHOL BREATH TEST: 0.23 (ref 0–0.01)

## 2018-08-15 PROCEDURE — 90791 PSYCH DIAGNOSTIC EVALUATION: CPT

## 2018-08-15 PROCEDURE — 99285 EMERGENCY DEPT VISIT HI MDM: CPT | Mod: Z6 | Performed by: FAMILY MEDICINE

## 2018-08-15 PROCEDURE — 94640 AIRWAY INHALATION TREATMENT: CPT

## 2018-08-15 PROCEDURE — 82075 ASSAY OF BREATH ETHANOL: CPT

## 2018-08-15 PROCEDURE — 99285 EMERGENCY DEPT VISIT HI MDM: CPT | Mod: 25 | Performed by: FAMILY MEDICINE

## 2018-08-15 PROCEDURE — HZ2ZZZZ DETOXIFICATION SERVICES FOR SUBSTANCE ABUSE TREATMENT: ICD-10-PCS | Performed by: FAMILY MEDICINE

## 2018-08-15 RX ORDER — ALBUTEROL SULFATE 90 UG/1
2 AEROSOL, METERED RESPIRATORY (INHALATION) ONCE
Status: COMPLETED | OUTPATIENT
Start: 2018-08-15 | End: 2018-08-16

## 2018-08-15 RX ORDER — HALOPERIDOL 5 MG/ML
5 INJECTION INTRAMUSCULAR ONCE
Status: DISCONTINUED | OUTPATIENT
Start: 2018-08-15 | End: 2018-08-29 | Stop reason: HOSPADM

## 2018-08-15 RX ORDER — IPRATROPIUM BROMIDE AND ALBUTEROL SULFATE 2.5; .5 MG/3ML; MG/3ML
3 SOLUTION RESPIRATORY (INHALATION) ONCE
Status: DISCONTINUED | OUTPATIENT
Start: 2018-08-15 | End: 2018-08-16 | Stop reason: CLARIF

## 2018-08-15 NOTE — IP AVS SNAPSHOT
MRN:[id number]                      After Visit Summary   8/15/2018    Abdulaziz Mart    MRN: [id number]           Thank you!     Thank you for choosing [facility] for your care. Our goal is always to provide you with excellent care.        Patient Information     Date Of Birth          1995        Designated Caregiver       Most Recent Value    Caregiver    Will someone help with your care after discharge? yes    Name of designated caregiver Step Miguel Harrell    Phone number of caregiver [phone] (not sure, but believes this the #)    Caregiver address [id number] [place] [address]      About your hospital stay     You were admitted on:  August 16, 2018 You last received care in the:  [facility]    You were discharged on:  August 29, 2018       Who to Call     For medical emergencies, please call 911.  For non-urgent questions about your medical care, please call your primary care provider or clinic, None          Attending Provider     Provider Specialty    Navneet Douglas MD Family Practice    [facility], Erin KEVIN MD Emergency Medicine    Wilmer Hampton MD Psychiatry       Primary Care Provider Fax #    Physician No Ref-Primary [phone]      Further instructions from your care team        Behavioral Discharge Planning and Instructions      Summary:  You were admitted on 8/15/2018  due to Suicidal Ideations, Self Injurious Behaviors and Chemical Use Issues.  You were treated by Dr. Wilmer Hampton MD and discharged on 8/29/18 from [facility] to [facility].     Principal Diagnosis: Polysubstance use     Health Care Follow-up Appointments:   [address] [address]  Phone: [phone]  Fax: [phone] HUC PLEASE FAX DISCHARGE PAPERWORK     If no appointments scheduled, explain please participate fully in CD treatment at [facility], and work with your treatment team there on a safe discharge plan.  See below for mental health resources in your area:   1) [facility]: [address] [place] [address] Phone: [phone]  2) [facility]): [address], [address] [facility] [address] Phone: [phone]  3) [facility]: [facility] [address] [place]. [address] Phone: [phone]  Attend all scheduled appointments with your outpatient providers. Call at least 24 hours in advance if you need to reschedule an appointment to ensure continued access to your outpatient providers.   Major Treatments, Procedures and Findings:  You were provided with: a psychiatric assessment, assessed for medical stability, medication evaluation and/or management, group therapy and milieu management    Symptoms to Report: feeling more aggressive, increased confusion, losing more sleep, mood getting worse or thoughts of suicide    Early warning signs can include: increased depression or anxiety sleep disturbances increased thoughts or behaviors of suicide or self-harm  increased unusual thinking, such as paranoia or hearing voices    Safety and Wellness:  Take all medicines as directed.  Make no changes unless your doctor suggests them.      Follow treatment recommendations.  Refrain from alcohol and non-prescribed drugs.  Ask your support system to help you reduce your access to items that could harm yourself or others. If there is a concern for safety, call 911.    Resources:   Crisis Intervention: 923.508.9936 or 919-874-4493 (TTY: 792.791.5093).  Call anytime for help.  National [place] on Mental Illness (www.[place].dotty.org): 905.244.4842 or 904-689-6281.  Alcoholics Anonymous (www.alcoholics-anonymous.org): Check your phone book for your local chapter.  Suicide Awareness Voices of Education (SAVE) (www.save.org): 525-119-AVNJ (7606)  National Suicide Prevention Line (www.mentalhealthmn.org): 326-942-QSUV  "(8464)  Mental Health Consumer/Survivor Network of [place] (www.mhcsn.net): 101-320-0778 or 937-070-0808  Mental Health Association of [place] (www.mentalhealth.org): 683.138.7200 or 370-693-7448  Self- Management and Recovery Training., SMART-- Toll free: 397.140.3149  www.[facility]  [facility] Crisis Response [phone]    The treatment team has appreciated the opportunity to work with you.     Please take care and make your recovery a daily recovery.   If you have any questions or concerns our unit number is [phone].  Thank you.         Pending Results     No orders found from 2018 to 2018.            Admission Information     Date & Time Provider Department Dept. Phone    8/15/2018 Wilmer Hampton MD [facility] [phone]      Your Vitals Were     Blood Pressure Pulse Temperature Respirations Height Weight    136/91 (BP Location: Left arm) 94 96.6  F (35.9  C) 16 1.727 m (5' 8\") 59.6 kg (131 lb 6.4 oz)    Pulse Oximetry BMI (Body Mass Index)                97% 19.98 kg/m2          [facility] Information     [facility] lets you send messages to your doctor, view your test results, renew your prescriptions, schedule appointments and more. To sign up, go to [email or web address] . Click on \"Log in\" on the left side of the screen, which will take you to the Welcome page. Then click on \"Sign up Now\" on the right side of the page.     You will be asked to enter the access code listed below, as well as some personal information. Please follow the directions to create your username and password.     Your access code is: [email or web address]  Expires: 2018 10:27 AM     Your access code will  in 90 days. If you need help or a new code, please call your [facility] clinic or [phone].        Care EveryWhere ID     This is your Care EveryWhere ID. This could be used by other organizations to access your [facility] medical records  [id number]        Equal Access to Services     RODOLFO MORGAN AH: Edenilson collazo " perico Turcios, waanishda luqadaha, qaybta kaalmada suman, jose yoo lakomaljanet raymundo Lopez [facility] [phone].    ATENCIÓN: Si yannila yandy, tiene a ahn disposición servicios gratuitos de asistencia lingüística. Dillon al [phone].    We comply with applicable federal civil rights laws and [place] laws. We do not discriminate on the basis of race, color, national origin, age, disability, sex, sexual orientation, or gender identity.               Review of your medicines      START taking        Dose / Directions    busPIRone 10 MG tablet   Commonly known as:  BUSPAR   Used for:  Adjustment disorder with anxious mood        Dose:  10 mg   Take 1 tablet (10 mg) by mouth 3 times daily   Quantity:  90 tablet   Refills:  0       hydrocortisone 2.5 % cream   Used for:  Other eczema        Apply topically 2 times daily   Quantity:  20 g   Refills:  0         CONTINUE these medicines which may have CHANGED, or have new prescriptions. If we are uncertain of the size of tablets/capsules you have at home, strength may be listed as something that might have changed.        Dose / Directions    albuterol 108 (90 Base) MCG/ACT inhaler   Commonly known as:  PROAIR HFA/PROVENTIL HFA/VENTOLIN HFA   This may have changed:  reasons to take this   Used for:  Mild persistent asthma without complication        Dose:  2 puff   Inhale 2 puffs into the lungs every 4 hours as needed for wheezing or shortness of breath / dyspnea   Quantity:  1 Inhaler   Refills:  0         CONTINUE these medicines which have NOT CHANGED        Dose / Directions    escitalopram 10 MG tablet   Commonly known as:  LEXAPRO   Used for:  Other depression        Dose:  10 mg   Take 1 tablet (10 mg) by mouth daily   Quantity:  30 tablet   Refills:  0         STOP taking     hydrOXYzine 25 MG tablet   Commonly known as:  ATARAX           TRAZODONE HCL PO                Where to get your medicines      These medications were sent to [facility] [place], [place] - [address]  [address] [facility], [facility] [address]     Phone:  [phone]     albuterol 108 (90 Base) MCG/ACT inhaler    busPIRone 10 MG tablet    escitalopram 10 MG tablet    hydrocortisone 2.5 % cream                Protect others around you: Learn how to safely use, store and throw away your medicines at www.disposemymeds.org.             Medication List: This is a list of all your medications and when to take them. Check marks below indicate your daily home schedule. Keep this list as a reference.      Medications           Morning Afternoon Evening Bedtime As Needed    albuterol 108 (90 Base) MCG/ACT inhaler   Commonly known as:  PROAIR HFA/PROVENTIL HFA/VENTOLIN HFA   Inhale 2 puffs into the lungs every 4 hours as needed for wheezing or shortness of breath / dyspnea   Last time this was given:  2 puffs on 8/27/2018  8:43 PM                                busPIRone 10 MG tablet   Commonly known as:  BUSPAR   Take 1 tablet (10 mg) by mouth 3 times daily   Last time this was given:  10 mg on 8/29/2018  8:42 AM                                escitalopram 10 MG tablet   Commonly known as:  LEXAPRO   Take 1 tablet (10 mg) by mouth daily   Last time this was given:  10 mg on 8/29/2018  8:42 AM                                hydrocortisone 2.5 % cream   Apply topically 2 times daily   Last time this was given:  8/27/2018  8:31 AM

## 2018-08-15 NOTE — IP AVS SNAPSHOT
[address] [address]    Phone:  [phone]                                       After Visit Summary   8/15/2018    Abdulaziz Mart    MRN: [id number]           After Visit Summary Signature Page     I have received my discharge instructions, and my questions have been answered. I have discussed any challenges I see with this plan with the nurse or doctor.    ..........................................................................................................................................  Patient/Patient Representative Signature      ..........................................................................................................................................  Patient Representative Print Name and Relationship to Patient    ..................................................               ................................................  Date                                            Time    ..........................................................................................................................................  Reviewed by Signature/Title    ...................................................              ..............................................  Date                                                            Time          22EPIC Rev 08/18

## 2018-08-15 NOTE — ED NOTES
I have performed an in person assessment of the patient. Based on this assessment the patient no longer requires a one on one attendant at this point in time.    Navneet Douglas MD  4:02 PM  August 15, 2018           Navneet Douglas MD  08/15/18 1420

## 2018-08-15 NOTE — ED PROVIDER NOTES
"  History     Chief Complaint   Patient presents with     Alcohol Intoxication     sister called 911 for pt intoxicated and suicidal     Suicidal     threatened suicide to sister, self-inflicted superficial lac to arm     HPI  Abdulaziz Mart is a 23 year old male who sense with alcohol abuse and reported suicidal ideation.  Patient at this time is somewhat reserved and not willing to provide me much history.  He states \"I may have told someone I was suicidal\".  He is writing on a black board \"they think I am suicidal\".  He reportedly called his sister and told her he was thinking about killing himself while drinking today.  He has a superficial laceration on his arm which he admits he self-inflicted.  Really quite intoxicated and not willing to offer much history at this moment.    I have reviewed the Medications, Allergies, Past Medical and Surgical History, and Social History in the [facility] system.  Past Medical History:   Diagnosis Date     Alcohol abuse      Asthma      Oppositional defiant disorder, mild      Seasonal allergies      Sleep disorder        Review of Systems  All other systems were reviewed and are negative    Physical Exam   BP: 120/65  Pulse: 89  Temp: 97.1  F (36.2  C)  Resp: 16  SpO2: 98 %      Physical Exam   Constitutional: He is oriented to person, place, and time. He appears well-developed and well-nourished. He appears distressed (Pacing, smells of alcohol).   HENT:   Head: Normocephalic and atraumatic.   Mouth/Throat: Oropharynx is clear and moist.   Eyes: EOM are normal. Pupils are equal, round, and reactive to light.   Neck: Normal range of motion. Neck supple. No tracheal deviation present. No thyromegaly present.   Cardiovascular: Normal rate, regular rhythm, normal heart sounds and intact distal pulses.  Exam reveals no gallop and no friction rub.    No murmur heard.  Pulmonary/Chest: Effort normal and breath sounds normal. He exhibits no tenderness.   Abdominal: Soft. Bowel sounds are " normal. He exhibits no distension and no mass. There is no tenderness.   Musculoskeletal: He exhibits no edema or tenderness.   Neurological: He is alert and oriented to person, place, and time. No cranial nerve deficit. Coordination normal.   Skin: Skin is warm and dry. No rash noted.   Psychiatric: His behavior is normal. His mood appears anxious. His speech is delayed. Slurred: Consistent with markedly elevated alcohol level. He exhibits a depressed mood.   Nursing note and vitals reviewed.      ED Course     ED Course     Procedures             Critical Care time:  none             Labs Ordered and Resulted from Time of ED Arrival Up to the Time of Departure from the ED   ALCOHOL BREATH TEST POCT - Abnormal; Notable for the following:        Result Value    Alcohol Breath Test 0.235 (*)     All other components within normal limits   DRUG ABUSE SCREEN 6 CHEM DEP URINE ([facility])            Assessments & Plan (with Medical Decision Making)   Patient with a history of substance abuse and some chronic mental health issues as well.  Oriented today acutely intoxicated with alcohol, minimally cooperative, and there was report of some suicidal thinking.  I evaluated him initially and he was able to contract for safety in the emergency department of his one-to-one removed.  He would not cooperate with a full mental health evaluation.  My medical evaluation revealed alcohol intoxication and a small abrasion is his only medical issues.  He appears medically stable for psychiatric evaluation.  We will allow him to sober in the emergency department and ask one of the mental health 's to see him for assistance with finding the proper level of care and resources to help with his mental health and chemical dependency issues.  Thus far his time sobering has been unremarkable and he will be signed out the evening physician at shift change.    I have reviewed the nursing notes.    I have reviewed the findings, diagnosis, plan  and need for follow up with the patient.    New Prescriptions    No medications on file       Final diagnoses:   Alcoholic intoxication with complication (H)       8/15/2018   [facility], [place], EMERGENCY DEPARTMENT     Navneet Douglas MD  08/15/18 0929

## 2018-08-15 NOTE — ED NOTES
"Pt kept his cell phone with him. Was observed by writer taking the phone apart and attempting to remove a chip component. Asked pt why he is dismantling his phone, he states, \"I'm curious about how it works.\"   "

## 2018-08-15 NOTE — ED NOTES
Bed: ED08  Expected date: 8/15/18  Expected time: 3:25 PM  Means of arrival:   Comments:  Hayes 595  23 year old male   SI ETOH  Uncooperative

## 2018-08-16 LAB
AMPHETAMINES UR QL SCN: NEGATIVE
BARBITURATES UR QL: NEGATIVE
BENZODIAZ UR QL: NEGATIVE
CANNABINOIDS UR QL SCN: NEGATIVE
COCAINE UR QL: NEGATIVE
ETHANOL UR QL SCN: NEGATIVE
OPIATES UR QL SCN: NEGATIVE

## 2018-08-16 PROCEDURE — 12400007 ZZH R&B MH INTERMEDIATE UMMC

## 2018-08-16 PROCEDURE — 25000125 ZZHC RX 250: Performed by: EMERGENCY MEDICINE

## 2018-08-16 PROCEDURE — 80307 DRUG TEST PRSMV CHEM ANLYZR: CPT | Performed by: FAMILY MEDICINE

## 2018-08-16 PROCEDURE — 25000132 ZZH RX MED GY IP 250 OP 250 PS 637: Performed by: EMERGENCY MEDICINE

## 2018-08-16 PROCEDURE — 25000132 ZZH RX MED GY IP 250 OP 250 PS 637: Performed by: NURSE PRACTITIONER

## 2018-08-16 PROCEDURE — 80320 DRUG SCREEN QUANTALCOHOLS: CPT | Performed by: FAMILY MEDICINE

## 2018-08-16 RX ORDER — DIAZEPAM 5 MG
5-20 TABLET ORAL EVERY 30 MIN PRN
Status: DISCONTINUED | OUTPATIENT
Start: 2018-08-16 | End: 2018-08-19

## 2018-08-16 RX ORDER — ALUMINA, MAGNESIA, AND SIMETHICONE 2400; 2400; 240 MG/30ML; MG/30ML; MG/30ML
30 SUSPENSION ORAL EVERY 4 HOURS PRN
Status: DISCONTINUED | OUTPATIENT
Start: 2018-08-16 | End: 2018-08-29 | Stop reason: HOSPADM

## 2018-08-16 RX ORDER — ALBUTEROL SULFATE 90 UG/1
2 AEROSOL, METERED RESPIRATORY (INHALATION) EVERY 4 HOURS PRN
Status: DISCONTINUED | OUTPATIENT
Start: 2018-08-16 | End: 2018-08-29 | Stop reason: HOSPADM

## 2018-08-16 RX ORDER — BISACODYL 10 MG
10 SUPPOSITORY, RECTAL RECTAL DAILY PRN
Status: DISCONTINUED | OUTPATIENT
Start: 2018-08-16 | End: 2018-08-29 | Stop reason: HOSPADM

## 2018-08-16 RX ORDER — HYDROXYZINE HYDROCHLORIDE 25 MG/1
25 TABLET, FILM COATED ORAL EVERY 4 HOURS PRN
Status: DISCONTINUED | OUTPATIENT
Start: 2018-08-16 | End: 2018-08-16

## 2018-08-16 RX ORDER — OLANZAPINE 10 MG/2ML
10 INJECTION, POWDER, FOR SOLUTION INTRAMUSCULAR
Status: DISCONTINUED | OUTPATIENT
Start: 2018-08-16 | End: 2018-08-29 | Stop reason: HOSPADM

## 2018-08-16 RX ORDER — ACETAMINOPHEN 325 MG/1
650 TABLET ORAL EVERY 4 HOURS PRN
Status: DISCONTINUED | OUTPATIENT
Start: 2018-08-16 | End: 2018-08-29 | Stop reason: HOSPADM

## 2018-08-16 RX ORDER — HYDROXYZINE HYDROCHLORIDE 25 MG/1
25-50 TABLET, FILM COATED ORAL EVERY 4 HOURS PRN
Status: DISCONTINUED | OUTPATIENT
Start: 2018-08-16 | End: 2018-08-29 | Stop reason: HOSPADM

## 2018-08-16 RX ORDER — TRAZODONE HYDROCHLORIDE 50 MG/1
50 TABLET, FILM COATED ORAL
Status: DISCONTINUED | OUTPATIENT
Start: 2018-08-16 | End: 2018-08-29 | Stop reason: HOSPADM

## 2018-08-16 RX ORDER — NICOTINE 21 MG/24HR
1 PATCH, TRANSDERMAL 24 HOURS TRANSDERMAL DAILY
Status: DISCONTINUED | OUTPATIENT
Start: 2018-08-16 | End: 2018-08-21

## 2018-08-16 RX ORDER — ALBUTEROL SULFATE 90 UG/1
2 AEROSOL, METERED RESPIRATORY (INHALATION) EVERY 6 HOURS PRN
Status: DISCONTINUED | OUTPATIENT
Start: 2018-08-16 | End: 2018-08-16

## 2018-08-16 RX ORDER — ESCITALOPRAM OXALATE 10 MG/1
10 TABLET ORAL DAILY
Status: DISCONTINUED | OUTPATIENT
Start: 2018-08-17 | End: 2018-08-29 | Stop reason: HOSPADM

## 2018-08-16 RX ORDER — OLANZAPINE 10 MG/1
10 TABLET ORAL
Status: DISCONTINUED | OUTPATIENT
Start: 2018-08-16 | End: 2018-08-29 | Stop reason: HOSPADM

## 2018-08-16 RX ORDER — ONDANSETRON 4 MG/1
4 TABLET, ORALLY DISINTEGRATING ORAL ONCE
Status: COMPLETED | OUTPATIENT
Start: 2018-08-16 | End: 2018-08-16

## 2018-08-16 RX ADMIN — TRAZODONE HYDROCHLORIDE 50 MG: 50 TABLET ORAL at 21:36

## 2018-08-16 RX ADMIN — ALBUTEROL SULFATE 2 PUFF: 90 AEROSOL, METERED RESPIRATORY (INHALATION) at 01:43

## 2018-08-16 RX ADMIN — ALBUTEROL SULFATE 2 PUFF: 90 AEROSOL, METERED RESPIRATORY (INHALATION) at 14:09

## 2018-08-16 RX ADMIN — ONDANSETRON 4 MG: 4 TABLET, ORALLY DISINTEGRATING ORAL at 09:03

## 2018-08-16 ASSESSMENT — ACTIVITIES OF DAILY LIVING (ADL)
DRESS: INDEPENDENT
LAUNDRY: WITH SUPERVISION
GROOMING: INDEPENDENT
ORAL_HYGIENE: INDEPENDENT

## 2018-08-16 NOTE — ED NOTES
Writer in patients room to do rounds.  Patient awakens to voice and responding to questions sarcastically.  Patient with blanket over his neck and fitted sheet not in site.  Patient pulled down blanket, and fitted sheet was tied around his neck.  Patient took fitted sheet off neck and handed to writer.  All linen pulled from patient's room.  Patient without any injuries.

## 2018-08-16 NOTE — ED NOTES
Spoke with intake, they asked to fax patient information to [facility].  [phone]  Items faxed

## 2018-08-16 NOTE — PHARMACY-ADMISSION MEDICATION HISTORY
Admission Medication History status for the 8/15/2018 admission is complete.  See EPIC admission navigator for Prior to Admission medications.    Medication history sources:  Patient     Medication history source reliability: Good    Medication adherence:  Poor    Changes made to PTA medication list (reason)  Added: None  Deleted: None  Changed: None    Additional medication history information (including reliability of information, actions taken by pharmacist): Pt hasn't taken his Lexapro and hydroxyzine in a few months.     Time spent in this activity: 10 minutes    Medication history completed by: Lety Torres, DamirD    Prior to Admission medications    Medication Sig Last Dose Taking? Auth Provider   albuterol (PROVENTIL HFA: VENTOLIN HFA) 108 (90 BASE) MCG/ACT inhaler Inhale 2 puffs into the lungs every 4 hours as needed. 8/16/2018 at Unknown time Yes Darian Banerjee MD   escitalopram (LEXAPRO) 10 MG tablet Take 1 tablet (10 mg) by mouth daily More than a month at Unknown time  Naegele, Debra Ann, APRN CNS   hydrOXYzine (ATARAX) 25 MG tablet Take 1-2 tablets (25-50 mg) by mouth every 4 hours as needed for anxiety More than a month at Unknown time  Naegele, Debra Ann, APRN CNS

## 2018-08-16 NOTE — ED NOTES
Intake called and asked if pt would tolerate scrubs. Pt cooperated and put scrubs on.  Pt has been calm and cooperative this shift.

## 2018-08-16 NOTE — ED NOTES
ED to Behavioral Floor Handoff    SITUATION  Abdulaziz Mart is a 23 year old male who speaks English and lives in a home with a spouse The patient arrived in the ED by ambulance from home with a complaint of Alcohol Intoxication (sister called 911 for pt intoxicated and suicidal) and Suicidal (threatened suicide to sister, self-inflicted superficial lac to arm)  .The patient's current symptoms started/worsened 1 day(s) ago and during this time the symptoms have increased.   In the ED, pt was diagnosed with   Final diagnoses:   Alcoholic intoxication with complication (H)   Suicidal ideation        Initial vitals were: BP: 120/65  Pulse: 89  Temp: 97.1  F (36.2  C)  Resp: 16  SpO2: 98 %   --------  Is the patient diabetic? No   If yes, last blood glucose? --     If yes, was this treated in the ED? --  --------  Is the patient inebriated (ETOH) No or Impaired on other substances? No  MSSA done? yes  Last MSSA score: 5   Were withdrawal symptoms treated? no  Does the patient have a seizure history? No. If yes, date of most recent seizure--  --------  Is the patient patient experiencing suicidal ideation? reports occasional suicidal thoughts representing feeling that life is not worth feeling    Homicidal ideation? denies current or recent homicidal ideation or behaviors.    Self-injurious behavior/urges? reports current or recent self injurious behavior or ideation including tying blanket around the neck.  ------  Was pt aggressive in the ED No  Was a code called No  Is the pt now cooperative? No  -------  Meds given in ED:   Medications   haloperidol lactate (HALDOL) injection 5 mg (not administered)   albuterol (PROAIR HFA/PROVENTIL HFA/VENTOLIN HFA) 108 (90 Base) MCG/ACT inhaler 2 puff (2 puffs Inhalation Given 8/16/18 0143)   ondansetron (ZOFRAN-ODT) ODT tab 4 mg (4 mg Oral Given 8/16/18 0903)      Family present during ED course? No  Family currently present? No    BACKGROUND  Does the patient have a cognitive  "impairment or developmental disability? No  Allergies:   Allergies   Allergen Reactions     Animal Dander      Other [Seasonal Allergies]    .   Social demographics are   Social History     Social History     Marital status: Single     Spouse name: N/A     Number of children: N/A     Years of education: N/A     Social History Main Topics     Smoking status: Current Every Day Smoker     Packs/day: 0.25     Smokeless tobacco: Never Used     Alcohol use Yes      Comment: up to 1 liter  every week end     Drug use: No      Comment: daily     Sexual activity: Yes     Other Topics Concern     None     Social History Narrative    Abdulaziz lives with his mom, step-dad and 3 younger sisters.  He has an older sister that lives out of the home. He's in the 12th grade at [employer], and is already \"missing some credits\" to be able to graduate.  He has 1 minor consumption charge and 2 paraphernalia charges against him.         ASSESSMENT  Labs results   Labs Ordered and Resulted from Time of ED Arrival Up to the Time of Departure from the ED   ALCOHOL BREATH TEST POCT - Abnormal; Notable for the following:        Result Value    Alcohol Breath Test 0.235 (*)     All other components within normal limits   DRUG ABUSE SCREEN 6 CHEM DEP URINE ([facility])      Imaging Studies: No results found for this or any previous visit (from the past 24 hour(s)).   Most recent vital signs /59  Pulse 89  Temp 98.3  F (36.8  C) (Oral)  Resp 14  SpO2 95%   Abnormal labs/tests/findings requiring intervention:---   Pain control: pt had none  Nausea control: good    RECOMMENDATION  Are any infection precautions needed (MRSA, VRE, etc.)? No If yes, what infection? --  ---  Does the patient have mobility issues? independently. If yes, what device does the pt use? ---  ---  Is patient on 72 hour hold or commitment? No If on 72 hour hold, have hold and rights been given to patient? N/A  Are admitting orders written if after 10 p.m. " ?N/A  Tasks needing to be completed:---     Javi rivera-- [phone] [facility]   [phone] [facility] ED

## 2018-08-16 NOTE — ED PROVIDER NOTES
The pt was signed out at shift change pending inpt bed availability. He rested throughout the night without difficulty. The pt will be signed out again at shift change, still awaiting and inpt bed.     Maddy John MD  08/16/18 0673

## 2018-08-17 LAB
ALBUMIN SERPL-MCNC: 3.7 G/DL (ref 3.4–5)
ALP SERPL-CCNC: 83 U/L (ref 40–150)
ALT SERPL W P-5'-P-CCNC: 23 U/L (ref 0–70)
ANION GAP SERPL CALCULATED.3IONS-SCNC: 8 MMOL/L (ref 3–14)
AST SERPL W P-5'-P-CCNC: 20 U/L (ref 0–45)
BASOPHILS # BLD AUTO: 0 10E9/L (ref 0–0.2)
BASOPHILS NFR BLD AUTO: 0.2 %
BILIRUB SERPL-MCNC: 0.9 MG/DL (ref 0.2–1.3)
BUN SERPL-MCNC: 17 MG/DL (ref 7–30)
CALCIUM SERPL-MCNC: 9 MG/DL (ref 8.5–10.1)
CHLORIDE SERPL-SCNC: 107 MMOL/L (ref 94–109)
CHOLEST SERPL-MCNC: 165 MG/DL
CO2 SERPL-SCNC: 27 MMOL/L (ref 20–32)
CREAT SERPL-MCNC: 1.05 MG/DL (ref 0.66–1.25)
DIFFERENTIAL METHOD BLD: NORMAL
EOSINOPHIL # BLD AUTO: 0.1 10E9/L (ref 0–0.7)
EOSINOPHIL NFR BLD AUTO: 2.1 %
ERYTHROCYTE [DISTWIDTH] IN BLOOD BY AUTOMATED COUNT: 12 % (ref 10–15)
GFR SERPL CREATININE-BSD FRML MDRD: 87 ML/MIN/1.7M2
GGT SERPL-CCNC: 22 U/L (ref 0–75)
GLUCOSE SERPL-MCNC: 87 MG/DL (ref 70–99)
HCT VFR BLD AUTO: 45.6 % (ref 40–53)
HDLC SERPL-MCNC: 74 MG/DL
HGB BLD-MCNC: 15.4 G/DL (ref 13.3–17.7)
IMM GRANULOCYTES # BLD: 0 10E9/L (ref 0–0.4)
IMM GRANULOCYTES NFR BLD: 0 %
LDLC SERPL CALC-MCNC: 70 MG/DL
LYMPHOCYTES # BLD AUTO: 1.6 10E9/L (ref 0.8–5.3)
LYMPHOCYTES NFR BLD AUTO: 36.1 %
MCH RBC QN AUTO: 30.7 PG (ref 26.5–33)
MCHC RBC AUTO-ENTMCNC: 33.8 G/DL (ref 31.5–36.5)
MCV RBC AUTO: 91 FL (ref 78–100)
MONOCYTES # BLD AUTO: 0.4 10E9/L (ref 0–1.3)
MONOCYTES NFR BLD AUTO: 8.4 %
NEUTROPHILS # BLD AUTO: 2.3 10E9/L (ref 1.6–8.3)
NEUTROPHILS NFR BLD AUTO: 53.2 %
NONHDLC SERPL-MCNC: 91 MG/DL
NRBC # BLD AUTO: 0 10*3/UL
NRBC BLD AUTO-RTO: 0 /100
PLATELET # BLD AUTO: 242 10E9/L (ref 150–450)
POTASSIUM SERPL-SCNC: 4.3 MMOL/L (ref 3.4–5.3)
PROT SERPL-MCNC: 7.3 G/DL (ref 6.8–8.8)
RBC # BLD AUTO: 5.01 10E12/L (ref 4.4–5.9)
SODIUM SERPL-SCNC: 142 MMOL/L (ref 133–144)
TRIGL SERPL-MCNC: 107 MG/DL
TSH SERPL DL<=0.005 MIU/L-ACNC: 0.67 MU/L (ref 0.4–4)
WBC # BLD AUTO: 4.4 10E9/L (ref 4–11)

## 2018-08-17 PROCEDURE — 80053 COMPREHEN METABOLIC PANEL: CPT | Performed by: NURSE PRACTITIONER

## 2018-08-17 PROCEDURE — 12400007 ZZH R&B MH INTERMEDIATE UMMC

## 2018-08-17 PROCEDURE — 84443 ASSAY THYROID STIM HORMONE: CPT | Performed by: NURSE PRACTITIONER

## 2018-08-17 PROCEDURE — 85025 COMPLETE CBC W/AUTO DIFF WBC: CPT | Performed by: NURSE PRACTITIONER

## 2018-08-17 PROCEDURE — G0177 OPPS/PHP; TRAIN & EDUC SERV: HCPCS

## 2018-08-17 PROCEDURE — 82977 ASSAY OF GGT: CPT | Performed by: NURSE PRACTITIONER

## 2018-08-17 PROCEDURE — 99223 1ST HOSP IP/OBS HIGH 75: CPT | Mod: AI | Performed by: PSYCHIATRY & NEUROLOGY

## 2018-08-17 PROCEDURE — 36415 COLL VENOUS BLD VENIPUNCTURE: CPT | Performed by: NURSE PRACTITIONER

## 2018-08-17 PROCEDURE — 25000132 ZZH RX MED GY IP 250 OP 250 PS 637: Performed by: NURSE PRACTITIONER

## 2018-08-17 PROCEDURE — 97127 ZZHC OT THERAPEUTIC INTERVENTION W/FOCUS ON COGNITIVE FUNCTION,EA 15 MIN: CPT | Mod: GO

## 2018-08-17 PROCEDURE — 80061 LIPID PANEL: CPT | Performed by: NURSE PRACTITIONER

## 2018-08-17 RX ADMIN — ACETAMINOPHEN 650 MG: 325 TABLET, FILM COATED ORAL at 16:59

## 2018-08-17 RX ADMIN — NICOTINE 1 PATCH: 21 PATCH TRANSDERMAL at 08:52

## 2018-08-17 RX ADMIN — ESCITALOPRAM OXALATE 10 MG: 10 TABLET ORAL at 08:16

## 2018-08-17 RX ADMIN — TRAZODONE HYDROCHLORIDE 50 MG: 50 TABLET ORAL at 21:29

## 2018-08-17 ASSESSMENT — ACTIVITIES OF DAILY LIVING (ADL)
ORAL_HYGIENE: INDEPENDENT
DRESS: SCRUBS (BEHAVIORAL HEALTH)
ORAL_HYGIENE: INDEPENDENT
HYGIENE/GROOMING: INDEPENDENT
GROOMING: INDEPENDENT
DRESS: INDEPENDENT;STREET CLOTHES
LAUNDRY: WITH SUPERVISION
LAUNDRY: WITH SUPERVISION

## 2018-08-17 NOTE — PLAN OF CARE
"Problem: Occupational Therapy Goals (Adult)  Goal: Occupational Therapy Goals  Stand Alone Therapy Goal    INITIAL OT NOTE  Pt attended 3 out of 3 OT groups offered. Pt actively participated in a mental health management group involving identification of coping skills beginning with every letter of the alphabet facilitated through group discussion. Pt observed to quietly work on task and listen to group discussion for first half of group, however consistently contributed ideas of positive coping skills during last half of group. Pt was receptive and respectful of ideas contributed by peers. Pt identified his top 5 coping skills including \"austrology, physics, reverse engineering, science, and understanding\". Pt actively participated in a structured occupational therapy group with a focus on a visual-spatial leisure task. Pt was able to follow 2-step directions of the novel task, and demonstrated strategic planning and problem solving throughout task. Pt remained focused for full duration of group and verbalized highly enjoying the task. Pt actively participated in occupational therapy clinic. Pt was able to ask for assistance as needed, and independently initiate a familiar, goal-directed task with minimal assistance from writer. Pt demonstrated good focus, planning, and problem solving for full duration of clinic. Pt appeared comfortable interacting with peers and writer and engaged in group conversation throughout. Overall, Pt was pleasant and engaged with a congruent affect during OT groups this date. Will continue to assess. Initial assessment to be completed upon additional group participation.    OT Self-Assessment  Pt was given and completed a written self-assessment form. OT staff reviewed with pt and explained the value of having them involved in their treatment plan, and provided options to meet current needs/self-identified goals.     Pt identified \"family, job, drinking, life\" as stressors/events that " "led to hospitalization.    Pt identified the following symptoms that they are currently dealing with:   Emotions: sadness, anger or resentment, feeling abandoned, feeling helpless, mood swings, feeling depressed, guilt, and despair\"  Thoughts: racing thoughts, memory problems, trouble concentrating, nightmares, and self-harm or suicidal thoughts  Behaviors: suicidal gesture, self-harming actions, withdrawal, increased drinking, restless behaviors, and budget or money concerns     Self-identified coping skills: \"puzzles\"  Self-identified social supports: \"dad\"  Self-identified personal strengths: \"determination\"    Goals: look at how my self-destructive behavior affects me (or others), find resources and support, learn to make choices and take action, problem-solving skills, improve focus and concentration, and manage my time or schedule          "

## 2018-08-17 NOTE — H&P
History and Physical    Abdulaziz Mart MRN# [id number]   Age: 23 year old YOB: 1995     Date of Admission:  8/15/2018          Contacts:   patient and electronic chart         Assessment:   This patient is a 23 year old  male with a past psychiatric history of Alcohol use, depression, other substance use who presents with SI, out of control behaviors, aggression and s/p suicide attempt. ( Pt tied bedding around neck in ER )    Significant symptoms include SI, aggression, irritable, depressed and substance use.    There is genetic loading for mood.  Medical history does appear to be significant for asthma.  Substance use does appear to be playing a contributing role in the patient's presentation.  Patient appears to cope with stress/frustration/emotion by using substances, acting out to self, acting out to others and aggression.  Stressors include legal issues and family dynamics.  Patient's support system includes family.    Risk for harm is moderate-high.  Risk factors: SI, maladaptive coping, substance use, family history, family dynamics, impulsive and past behaviors  Protective factors: family     Hospitalization needed for safety and stabilization.          Diagnoses and Plan:   Principal Diagnosis: Polysubstance use,   Unspecified Depressive disorder, rule out substance-induced mood disorder  Unit: Station 10  Attending: Memo  Medications: risks/benefits discussed with patient  - Lexapro 10mg daily  Laboratory/Imaging:  - UDS neg, COMP wnl, CBC wnl, TSH wnl and lipids wnl   -ROBBIE 0.235  Consults:  - CD assessment on Monday, 8/20  Patient will be treated in therapeutic milieu with appropriate individual and group therapies as described.  Secondary psychiatric diagnoses of concern this admission:  Rule out Cluster B personality    Medical diagnoses to be addressed this admission:   Asthma        Legal Status: Voluntary    Safety Assessment:   Checks: Status 15  Precautions: Suicide  Pt  has not required locked seclusion or restraints in the past 24 hours to maintain safety, please refer to RN documentation for further details.    The risks, benefits, alternatives and side effects have been discussed and are understood by the patient and other caregivers.    Target symptoms to stabilize: SI, aggression and disordered eating  Target disposition: MICD faciliity Pt agrees to go to treatment     Attestation:  Patient has been seen and evaluated by me,  Wilmer Hampton MD         Chief Complaint:   History is obtained from the patient         History of Present Illness:   Patient was admitted from ER for SI, out of control behaviors, aggression and alcohol use.  .     After the last discharge in 9/2017, he did not follow up on treatment, has not taken medication for 4 months now.  He continued drinking alcohol every day, and for the last 2 months, he has been snorting cocaine every day.   He called his sister , intoxicated, and told her that he was having SI. In ER, he said he might have told someone that he was suicidal. Then he began acting violently. He started taking mechanics of bed apart, so they had to remove the bed with a chair. He used profanities toward staff in ER.   He tried to take apart cell phone. He had laceration on left arm, but denied it was a suicide attempt.   He tied bedding around the neck.     After a while, he became calmer and cooperative.     On my examination, he admits that he has a problem with substance use and he wants to go to treatment.   This has been a pattern for him, that he gets intoxicated then becomes suicidal.                 Psychiatric Review of Systems:   Depressive Sx: Irritable, Low mood and SI  DMDD: Irritable, Frequent outbursts and Poor frustration tolerance  Manic Sx: none  Anxiety Sx: none  PTSD: none  Psychosis: none  ADHD: impulsive    ASD: none  ED: none  RAD:none  Cluster B: poor coping             Medical Review of Systems:   The 10 point Review  of Systems is negative other than noted in the HPI           Psychiatric History:     Prior Psychiatric Diagnoses: yes,  substance use ( alcohol, meth, cocaine), ODD, dysthymia, MDD   Psychiatric Hospitalizations: yes, 11/2011, 8/2012, 9/2017, at [place].   History of Psychosis none   Suicide Attempts yes, 3 attempts, by overdosing, tying object around neck,   Self-Injurious Behavior: yes, cutting   Violence Toward Others yes,    History of ECT: none   Use of Psychotropics yes, Lexapro            Substance Use History:   ETOH, cannabis, cocaine, methamphetamine , cocaine.   Daily alcohol use 16 beers a day, since 16 yo  Daily cocaine use , for 2 months, snorting.   History of meth use.   History of dextromethorphan, THC, use.   Has been in [employer] in the past at age 16 yo, once  Tobacco  1ppd.          Past Medical/Surgical History:   I have reviewed this patient's past medical history-Asthma, allergy  This patient has no significant past surgical history    No History of: head trauma with or without loss of consciousness and seizures    Primary Care Physician: No Ref-Primary, Physician             Allergies:     Allergies   Allergen Reactions     Animal Dander      Other [Seasonal Allergies]           Medications:     Prescriptions Prior to Admission   Medication Sig Dispense Refill Last Dose     albuterol (PROVENTIL HFA: VENTOLIN HFA) 108 (90 BASE) MCG/ACT inhaler Inhale 2 puffs into the lungs every 4 hours as needed. 1 Inhaler 0 8/16/2018 at Unknown time     escitalopram (LEXAPRO) 10 MG tablet Take 1 tablet (10 mg) by mouth daily 30 tablet 1 More than a month at Unknown time     hydrOXYzine (ATARAX) 25 MG tablet Take 1-2 tablets (25-50 mg) by mouth every 4 hours as needed for anxiety 120 tablet 1 More than a month at Unknown time     TRAZODONE HCL PO Take 50 mg by mouth nightly as needed for sleep   More than a month at Unknown time          Social History:   Early history: Pt has never met biological  father. Step father has been with him since pt was 2 yo. Later parents . 4 sisters.    Educational history: 11 th grade education.    Abuse history: denies   Guns: no   Current living situation: Lives with step-father,   Works full time overnight,            Family History:   Depression -4 sisters           Labs:     Recent Results (from the past 24 hour(s))   Drug abuse screen 6 urine (tox)    Collection Time: 08/16/18  6:20 PM   Result Value Ref Range    Amphetamine Qual Urine Negative NEG^Negative    Barbiturates Qual Urine Negative NEG^Negative    Benzodiazepine Qual Urine Negative NEG^Negative    Cannabinoids Qual Urine Negative NEG^Negative    Cocaine Qual Urine Negative NEG^Negative    Ethanol Qual Urine Negative NEG^Negative    Opiates Qualitative Urine Negative NEG^Negative   Comprehensive metabolic panel    Collection Time: 08/17/18  8:04 AM   Result Value Ref Range    Sodium 142 133 - 144 mmol/L    Potassium 4.3 3.4 - 5.3 mmol/L    Chloride 107 94 - 109 mmol/L    Carbon Dioxide 27 20 - 32 mmol/L    Anion Gap 8 3 - 14 mmol/L    Glucose 87 70 - 99 mg/dL    Urea Nitrogen 17 7 - 30 mg/dL    Creatinine 1.05 0.66 - 1.25 mg/dL    GFR Estimate 87 >60 mL/min/1.7m2    GFR Estimate If Black >90 >60 mL/min/1.7m2    Calcium 9.0 8.5 - 10.1 mg/dL    Bilirubin Total 0.9 0.2 - 1.3 mg/dL    Albumin 3.7 3.4 - 5.0 g/dL    Protein Total 7.3 6.8 - 8.8 g/dL    Alkaline Phosphatase 83 40 - 150 U/L    ALT 23 0 - 70 U/L    AST 20 0 - 45 U/L   CBC with platelets differential    Collection Time: 08/17/18  8:04 AM   Result Value Ref Range    WBC 4.4 4.0 - 11.0 10e9/L    RBC Count 5.01 4.4 - 5.9 10e12/L    Hemoglobin 15.4 13.3 - 17.7 g/dL    Hematocrit 45.6 40.0 - 53.0 %    MCV 91 78 - 100 fl    MCH 30.7 26.5 - 33.0 pg    MCHC 33.8 31.5 - 36.5 g/dL    RDW 12.0 10.0 - 15.0 %    Platelet Count 242 150 - 450 10e9/L    Diff Method Automated Method     % Neutrophils 53.2 %    % Lymphocytes 36.1 %    % Monocytes 8.4 %    %  "Eosinophils 2.1 %    % Basophils 0.2 %    % Immature Granulocytes 0.0 %    Nucleated RBCs 0 0 /100    Absolute Neutrophil 2.3 1.6 - 8.3 10e9/L    Absolute Lymphocytes 1.6 0.8 - 5.3 10e9/L    Absolute Monocytes 0.4 0.0 - 1.3 10e9/L    Absolute Eosinophils 0.1 0.0 - 0.7 10e9/L    Absolute Basophils 0.0 0.0 - 0.2 10e9/L    Abs Immature Granulocytes 0.0 0 - 0.4 10e9/L    Absolute Nucleated RBC 0.0    TSH with free T4 reflex and/or T3 as indicated    Collection Time: 08/17/18  8:04 AM   Result Value Ref Range    TSH 0.67 0.40 - 4.00 mU/L   Lipid panel    Collection Time: 08/17/18  8:04 AM   Result Value Ref Range    Cholesterol 165 <200 mg/dL    Triglycerides 107 <150 mg/dL    HDL Cholesterol 74 >39 mg/dL    LDL Cholesterol Calculated 70 <100 mg/dL    Non HDL Cholesterol 91 <130 mg/dL   GGT    Collection Time: 08/17/18  8:04 AM   Result Value Ref Range    GGT 22 0 - 75 U/L     /72  Pulse 89  Temp 97.8  F (36.6  C) (Oral)  Resp 14  Ht 1.727 m (5' 8\")  Wt 55.8 kg (123 lb)  SpO2 97%  BMI 18.7 kg/m2  Weight is 123 lbs 0 oz  Body mass index is 18.7 kg/(m^2).       Psychiatric Examination:   Appearance:  awake, alert, adequately groomed, dressed in hospital scrubs, appeared as age stated, cooperative and no apparent distress  Attitude:  cooperative  Eye Contact:  fair  Mood:  better  Affect:  constricted mobility  Speech:  clear, coherent  Psychomotor Behavior:  no evidence of tardive dyskinesia, dystonia, or tics and intact station, gait and muscle tone  Thought Process:  linear  Associations:  no loose associations  Thought Content:  no evidence of suicidal ideation or homicidal ideation, no evidence of psychotic thought, no auditory hallucinations present and no visual hallucinations present  Insight:  fair  Judgment:  limited  Oriented to:  time, person, and place  Attention Span and Concentration:  fair  Recent and Remote Memory:  fair  Language: Able to name objects  Fund of Knowledge: appropriate  Muscle " Strength and Tone: normal  Gait and Station: Normal         Physical Exam:   I have reviewed the physical done by ER physician Navneet Douglas MD on 8/15/2018, there are no medication or medical status changes, and I agree with their original findings

## 2018-08-17 NOTE — PROGRESS NOTES
"Initial Psychosocial Assessment    I have reviewed the chart, met with the patient, and developed Care Plan.  Information for assessment was obtained from:     Chart review and interview with Pt.     Presenting Problem:  Per chart:  Admitted to st 10 on a 72 hour hold for increasing suicidal ideation. States he attempted \"a couple months ago\" by overdose of \"all the pills you guys gave me when I left the last time I was here\", but was not seen or hospitalized. Reports 2 other past attempts. States they were all impulsive acts while intoxicated. States he slept for 3 days and \"no one noticed\". Reports living with step dad and 3 younger sisters. States step father is supportive. Last at [facility] in Sept 2017. Reports daily alcohol use of \"as much as I can\" since he was 15 years old.MSSA upon admission is 1. BAL in ER was 0.235 when brought in yesterday. Reports daily cocaine use for 2 months, last use over 1 week ago. Denies hx IV drug use. Denies hx of withdrawal seizures. Pt is voicing a desire to attend inpt CD or dual treatment program.     Presents as calm, polite and cooperative completing admission. Alert and oriented x 4, blunted affect. Disheveled. Speech is clear, coherent. Denies hallucinations. Endorses ongoing passive SI.  Oriented to room and unit. Encouraged to voice needs, questions, and concerns. Pt verbalizes understanding.          Writer met with Pt who was cooperative during psychosocial assessment. Pt expressed a desire to go straight from the hospital to CD treatment due to severity of use. Pt was pleasant and appeared willing to get help/assistance for substance use issues. Per DEC they described Pt as having traits of anti-social personality disorder and odd behaviors such as attempting to dismantle a medical bed during assessment. Pt presented quite differently during my assessment with him, and seems dedicated to working on his sobriety.      History of Mental Health and Chemical " "Dependency:  History of about five previous psychiatric hospitalizations, with last at [facility] in September of 2017 on 4A. History of polysubstance use with cocaine, methamphetamines, and alcohol being DOCs. Pt has attended two CD treatments, last one was when Pt was 18 years old. Utox was positive for ETOH, Pt breathalyzed .235.      Family Description (Constellation, Family Psychiatric History):  Pt was raised in [place] by mother and stepfather who he calls \"dad\". Pt has four sisters, and he is second oldest. Never , Pt has one child who is about 2.5 years old.   History of schizophrenia and substance abuse (paternal)     Significant Life Events (Illness, Abuse, Trauma, Death):  Parents got  about two years ago.     Living Situation:  Pt lives with step-father and three of his sisters.     Educational Background:  Pt completed 11th grade in highschool     Occupational History:  Works full-time, overnights     Financial Status:  Employed     Legal Issues:  Admitted on a 72 hour hold, Pt has now signed in voluntary   Pt report he has court on Thursday.     Ethnic/Cultural Issues:   male     Spiritual Orientation:  None      Service History:  None     Social Functioning (organization, interests):  Interests: going on adventures with friends   Supports: stepfather, sisters a little bit     Current Treatment Providers are:  No current treatment providers.     Social Service Assessment/Plan:  Patient has been admitted for psychiatric stabilization due to suicidal ideation and SIB likely in the context of alcohol use. Patient will have psychiatric assessment and medication management by the psychiatrist. Medications will be reviewed and adjusted per MD as indicated. The treatment team will continue to assess and stabilize the patient's mental health symptoms with the use of medications and therapeutic programming. Hospital staff will provide a safe environment and a " therapeutic milieu. Staff will continue to assess patient as needed. Patient will participate in unit groups and activities. Patient will receive individual and group support on the unit.  CTC will do individual inpatient treatment planning and after care planning. CTC will discuss options for increasing community supports with the patient. CTC will coordinate with outpatient providers and will place referrals to ensure appropriate follow up care is in place.

## 2018-08-17 NOTE — PLAN OF CARE
"Admission:     Admitted to st 10 on a 72 hour hold for increasing suicidal ideation. States he attempted \"a couple months ago\" by overdose of \"all the pills you guys gave me when I left the last time I was here\", but was not seen or hospitalized. Reports 2 other past attempts. States they were all impulsive acts while intoxicated. States he slept for 3 days and \"no one noticed\". Reports living with step dad and 3 younger sisters. States step father is supportive. Last at [facility] in Sept 2017. Reports daily alcohol use of \"as much as I can\" since he was 15 years old.MSSA upon admission is 1. BAL in ER was 0.235 when brought in yesterday. Reports daily cocaine use for 2 months, last use over 1 week ago. Denies hx IV drug use. Denies hx of withdrawal seizures. Pt is voicing a desire to attend inpt CD or dual treatment program.     Presents as calm, polite and cooperative completing admission. Alert and oriented x 4, blunted affect. Disheveled. Speech is clear, coherent. Denies hallucinations. Endorses ongoing passive SI.  Oriented to room and unit. Encouraged to voice needs, questions, and concerns. Pt verbalizes understanding.   "

## 2018-08-17 NOTE — PROGRESS NOTES
"Pt had a good shift. He was calm and cooperative with staff. He denies SI, SIB, anxiety, racing thoughts, and psychotic symptoms (visual/auditory hallucinations). He reports depression, rating his depression at a 4 out of 10 with 10 being the worse. He has good insight on his situation and admission into the hospital for suicidal thoughts, self-injurious behaviors, and alcohol usage. He is hopeful of getting better and going to treatment. He states his sleep and appetite are \"good\" and reports no pain.       08/17/18 1346   Behavioral Health   Hallucinations denies / not responding to hallucinations   Thinking intact   Orientation person: oriented;place: oriented;date: oriented;time: oriented   Memory baseline memory   Insight admits / accepts   Judgement intact   Eye Contact at examiner   Affect full range affect   Mood mood is calm   Physical Appearance/Attire neat;appears stated age   Hygiene well groomed   Suicidality other (see comments)  (denies)   1. Wish to be Dead No   2. Non-Specific Active Suicidal Thoughts  No   Self Injury other (see comment)  (denies)   Elopement (none stated or observed)   Activity other (see comment)  (present in milieu, attending groups)   Speech clear;coherent   Psychomotor / Gait balanced;steady   Activities of Daily Living   Hygiene/Grooming independent   Oral Hygiene independent   Dress scrubs (behavioral health)   Laundry with supervision   Room Organization independent     "

## 2018-08-17 NOTE — PLAN OF CARE
Problem: Patient Care Overview  Goal: Individualization & Mutuality  Personal Plan of Care    Reasons you are in the Hospital  1. Suicidal thoughts  2. Addiction   3.  4.    Goals for Discharge   1. Go to treatment center   2.  3.

## 2018-08-17 NOTE — PROGRESS NOTES
08/16/18 2203   Patient Belongings   Did you bring any home meds/supplements to the hospital?  No   Patient Belongings cell phone/electronics;clothing;wallet;money (see comment);other (see comments)   Disposition of Belongings Kept with patient;Locker;Sent to security per site process   Belongings Search Yes   Clothing Search Yes   Second Staff Alberto - RN and Rodolfo Gamboa RN   General Info Comment [facility]; , (1) [employer] VISA card #[id number], (1) VISA card #[id number], (1) [employer] rewards card #[id number], and social security card (security envelope #[id number]).    Roman sent home : $143.00 cash     UNIT LOCKER #  :  (1) wallet with [place] DL and assorted business cards, (1) pair shorts with drawstrings, and (1) cell phone with charging cord.    GIVEN TO PATIENT:  Appropriate clothing items.      Matt Peng   08/16/2018      A               Admission:  I am responsible for any personal items that are not sent to the [facility] or pharmacy.  [facility] is not responsible for loss, theft or damage of any property in my possession.    Signature:  _________________________________ Date: _______  Time: _____                                              Staff Signature:  ____________________________ Date: ________  Time: _____      2nd Staff person, if patient is unable/unwilling to sign:    Signature: ________________________________ Date: ________  Time: _____     Discharge:  [facility] has returned all of my personal belongings:    Signature: _________________________________ Date: ________  Time: _____                                          Staff Signature:  ____________________________ Date: ________  Time: _____

## 2018-08-17 NOTE — PLAN OF CARE
"Problem: Patient Care Overview  Goal: Team Discussion  Team Plan:   BEHAVIORAL TEAM DISCUSSION    Participants: Dr. Memo MCKEON, Lalita QUIROZ, and Betty GARVEY RN   Progress: Initial behavioral team discussion.   Continued Stay Criteria/Rationale: Pt admitted voluntary due to suicidal ideation likely in the context of substance use.   Medical/Physical: See medical consult, if applicable.   Precautions:   Behavioral Orders   Procedures     Code 1 - Restrict to Unit     Routine Programming     As clinically indicated     Status 15     Every 15 minutes.     Suicide precautions     Patients on Suicide Precautions should have a Combination Diet ordered that includes a Diet selection(s) AND a Behavioral Tray selection for Safe Tray - with utensils, or Safe Tray - NO utensils       Plan: The plan is to assess the patient for mental health and medication needs.  The patient will be prescribed medications to treat the identified symptoms. Pt will receive inpatient CD evaluation, and plan is to do a \"door to door\" discharge to CD residential treatment if recommended by CD .   Rationale for change in precautions or plan: No change initial plan.             "

## 2018-08-17 NOTE — PROGRESS NOTES
Pt had signed RAYRAY for [facility]HA during psychosocial meeting.     Writer spoke with Savannah  at [facility] ([facility]) [phone] scheduled CD evaluation for Pt Monday, August 20th at 9:00 am. Writer informed Pt of CD evaluation, he expressed gratitude.

## 2018-08-18 LAB — T PALLIDUM AB SER QL: NONREACTIVE

## 2018-08-18 PROCEDURE — 25000132 ZZH RX MED GY IP 250 OP 250 PS 637: Performed by: NURSE PRACTITIONER

## 2018-08-18 PROCEDURE — 36415 COLL VENOUS BLD VENIPUNCTURE: CPT | Performed by: PSYCHIATRY & NEUROLOGY

## 2018-08-18 PROCEDURE — 12400007 ZZH R&B MH INTERMEDIATE UMMC

## 2018-08-18 PROCEDURE — 86803 HEPATITIS C AB TEST: CPT | Performed by: PSYCHIATRY & NEUROLOGY

## 2018-08-18 PROCEDURE — 87389 HIV-1 AG W/HIV-1&-2 AB AG IA: CPT | Performed by: PSYCHIATRY & NEUROLOGY

## 2018-08-18 PROCEDURE — 86706 HEP B SURFACE ANTIBODY: CPT | Performed by: PSYCHIATRY & NEUROLOGY

## 2018-08-18 PROCEDURE — 86780 TREPONEMA PALLIDUM: CPT | Performed by: PSYCHIATRY & NEUROLOGY

## 2018-08-18 RX ADMIN — ESCITALOPRAM OXALATE 10 MG: 10 TABLET ORAL at 09:20

## 2018-08-18 RX ADMIN — TRAZODONE HYDROCHLORIDE 50 MG: 50 TABLET ORAL at 20:45

## 2018-08-18 ASSESSMENT — ACTIVITIES OF DAILY LIVING (ADL)
DRESS: INDEPENDENT
DRESS: INDEPENDENT
GROOMING: INDEPENDENT
ORAL_HYGIENE: INDEPENDENT
LAUNDRY: WITH SUPERVISION
ORAL_HYGIENE: INDEPENDENT
GROOMING: INDEPENDENT

## 2018-08-18 NOTE — PROGRESS NOTES
08/17/18 2100   Behavioral Health   Hallucinations denies / not responding to hallucinations   Thinking intact   Orientation person: oriented;place: oriented;date: oriented;time: oriented   Memory baseline memory   Insight admits / accepts   Judgement intact   Eye Contact at examiner   Affect full range affect   Mood anxious;mood is calm   Physical Appearance/Attire neat;appears stated age   Hygiene well groomed   Suicidality other (see comments)  (Denies)   1. Wish to be Dead No   2. Non-Specific Active Suicidal Thoughts  No   Self Injury other (see comment)  (Denies)   Activity other (see comment)  (Present in milieu, attended groups, cooperative)   Speech clear;coherent   Activities of Daily Living   Hygiene/Grooming independent   Oral Hygiene independent   Dress independent;street clothes   Laundry with supervision   Room Organization independent     Throughout the evening Abdulaziz was active in the milieu. Social with peers. Patient reports attending groups - no visitor. Spent most of the evening working on a puzzle, and watching TV. Reports eating diner, and taking a shower today. Reports no SI/SIB because he has been distracting himself. Reports some anxiety but is managing with coping skills. Goal for the day was to learn something new, he reports learning a lot about his peers. Abdulaziz report his next steps are attending CD treatment. States he would like to go to inpatient treatment. Aftercare from the hospital is pending help from .

## 2018-08-18 NOTE — PLAN OF CARE
"Problem: Mood Impairment (Depressive Signs/Symptoms) (Adult)  Intervention: Promote Mood Improvement  Patient has been visible in the milieu, appropriately social with peers and staff. Participated in community meeting. Denies depression, denies suicidal ideation, plan, intent. Patient has endorses anxiety and jitters and understands this could be related to excessive caffeine intake as he states \"I've drank like 8 cups of coffee.\" Patient encouraged to reduce caffeine intake and alternate between coffee and other fluids. Patient is interested in inpatient chemical dependency treatment and will likely be hospitalized until he is accepted to a program. Patient's vital signs are stable and patient denies any withdrawal symptoms, no signs observed. MSSA score of 3 this shift. Continue to encourage participation in programming, encourage goal setting, identify strengths, build therapeutic relationship.         "

## 2018-08-19 PROCEDURE — 12400007 ZZH R&B MH INTERMEDIATE UMMC

## 2018-08-19 PROCEDURE — G0177 OPPS/PHP; TRAIN & EDUC SERV: HCPCS

## 2018-08-19 PROCEDURE — 25000132 ZZH RX MED GY IP 250 OP 250 PS 637: Performed by: NURSE PRACTITIONER

## 2018-08-19 RX ADMIN — ESCITALOPRAM OXALATE 10 MG: 10 TABLET ORAL at 08:06

## 2018-08-19 RX ADMIN — HYDROXYZINE HYDROCHLORIDE 50 MG: 25 TABLET ORAL at 14:05

## 2018-08-19 ASSESSMENT — ACTIVITIES OF DAILY LIVING (ADL)
LAUNDRY: WITH SUPERVISION
DRESS: SCRUBS (BEHAVIORAL HEALTH)
GROOMING: INDEPENDENT
ORAL_HYGIENE: INDEPENDENT
DRESS: INDEPENDENT
ORAL_HYGIENE: INDEPENDENT
GROOMING: INDEPENDENT

## 2018-08-19 NOTE — PLAN OF CARE
Problem: Occupational Therapy Goals (Adult)  Goal: Occupational Therapy Goals  Stand Alone Therapy Goal   Abdulaziz participated in a psychoeducational OT group with emphasis on self-care. Structured activity with gross motor components and group discussion were utilized to engage patients and facilitate brainstorming and education. He was engaged and social. He was on topic with affect appropriate to context.

## 2018-08-19 NOTE — PROGRESS NOTES
Pt denies SI, SIB, depression, anxiety, racing thoughts, and psychotic symptoms (auditory/visual hallucinations). He was calm during the day while spending the day in the lounge watching TV with other pt's. He attended group at 1030. Pt showered and attended meals.       08/19/18 1401   Behavioral Health   Hallucinations denies / not responding to hallucinations   Thinking intact   Orientation person: oriented;place: oriented;date: oriented;time: oriented   Memory baseline memory   Insight admits / accepts   Judgement intact   Eye Contact at examiner   Affect full range affect   Mood mood is calm   Physical Appearance/Attire neat   Hygiene well groomed   Suicidality other (see comments)  (denies)   1. Wish to be Dead No   2. Non-Specific Active Suicidal Thoughts  No   Self Injury other (see comment)  (denies)   Elopement (none stated or observed)   Activity other (see comment)  (social with others, present in milieu, watching tv)   Speech coherent;clear   Psychomotor / Gait balanced;steady   Activities of Daily Living   Hygiene/Grooming independent   Oral Hygiene independent   Dress scrubs (behavioral health)   Laundry with supervision   Room Organization independent

## 2018-08-19 NOTE — PROGRESS NOTES
"Abdulaziz has been visible and active in the milieu throughout the evening.  He was bright on approach,cooperative with unit and personal care protocols, social with peers, and responsive to staff inquiries and interventions.  During our check-in time, he rated both his depression and anxiety as moderated to low (4 and 3) respectively.  He endorsed only very occasional, fleeting SI without plan or intent and denied SIB urges.  He stated that his energy level, appetite, sleep, and concentration were all generally \"good.\"  He is anticipating an interview with [facility] this coming Monday to establish his suitability for CD treatment.  He is hopeful of being found appropriate for such programming.   Matt Peng   08/18/2018 08/18/18 2300   Behavioral Health   Hallucinations denies / not responding to hallucinations   Thinking intact   Orientation person: oriented;place: oriented   Memory baseline memory   Insight admits / accepts;insight appropriate to situation;insight appropriate to events   Judgement intact   Eye Contact at examiner   Affect full range affect   Mood mood is calm   Physical Appearance/Attire appears stated age;attire appropriate to age and situation;neat   Hygiene well groomed   Suicidality other (see comments)  (\"...very occasional and fleeting with no intent or plan...\")   1. Wish to be Dead No   2. Non-Specific Active Suicidal Thoughts  No   Self Injury other (see comment)  (denied SIB urges)   Elopement (no indicators)   Activity other (see comment)  (visible, social, engaged in milieu)   Speech clear;coherent   Psychomotor / Gait balanced;steady   Safety   Suicidality Status 15   Coping/Psychosocial   Verbalized Emotional State hopefulness   Psycho Education   Type of Intervention 1:1 intervention   Response participates with cues/redirection   Hours 0.5   Treatment Detail current MH status   Activities of Daily Living   Hygiene/Grooming independent   Oral Hygiene " independent   Dress independent   Room Organization independent

## 2018-08-19 NOTE — PLAN OF CARE
"Problem: Social/Occupational/Functional Impairment (Excessive Substance Use) (Adult)  Goal: Improved Social/Occupational/Functional Skills (Excessive Substance Use)    Pt was visible in the lounge watching TV with his peers. Pt reports coming to the hospital due to polysubstance use and suicidal ideation but denies having suicidal plan, intent, idea, or urge. Pt reports having a prior suicide attempt via overdose on his Trazodone which he took a handful of. However, pt vehemently denies having suicidal ideation and he contracts for safety, stating \" I'd tell staff if I had any suicidal urge or ideation.\"  Pt had a calm mood and brightened upon approach. Was cooperative and polite. Pt denied SI/ SIB and AH. Reported feeling safe and hopeful. Feels that his medications are working. Reports his concentration as \" good.\" Reported his appetite and sleep as good. No physical concerns reported or observed. No medication side effects. Order obtained to discontinue his suicide precaution.   Plan: Status 15s; Build trust with pt. Continue to build on strengths. Encourage healthy coping.           "

## 2018-08-20 LAB
HBV SURFACE AB SERPL IA-ACNC: 1.66 M[IU]/ML
HCV AB SERPL QL IA: NONREACTIVE
HIV 1+2 AB+HIV1 P24 AG SERPL QL IA: NONREACTIVE

## 2018-08-20 PROCEDURE — 25000132 ZZH RX MED GY IP 250 OP 250 PS 637: Performed by: NURSE PRACTITIONER

## 2018-08-20 PROCEDURE — G0177 OPPS/PHP; TRAIN & EDUC SERV: HCPCS

## 2018-08-20 PROCEDURE — 12400007 ZZH R&B MH INTERMEDIATE UMMC

## 2018-08-20 PROCEDURE — 99232 SBSQ HOSP IP/OBS MODERATE 35: CPT | Performed by: PSYCHIATRY & NEUROLOGY

## 2018-08-20 PROCEDURE — 97127 ZZHC OT THERAPEUTIC INTERVENTION W/FOCUS ON COGNITIVE FUNCTION,EA 15 MIN: CPT | Mod: GO

## 2018-08-20 RX ADMIN — HYDROXYZINE HYDROCHLORIDE 50 MG: 25 TABLET ORAL at 16:05

## 2018-08-20 RX ADMIN — ESCITALOPRAM OXALATE 10 MG: 10 TABLET ORAL at 09:17

## 2018-08-20 ASSESSMENT — ACTIVITIES OF DAILY LIVING (ADL)
DRESS: INDEPENDENT
DRESS: INDEPENDENT
LAUNDRY: WITH SUPERVISION
ORAL_HYGIENE: INDEPENDENT
GROOMING: INDEPENDENT
LAUNDRY: UNABLE TO COMPLETE
GROOMING: INDEPENDENT
ORAL_HYGIENE: INDEPENDENT

## 2018-08-20 NOTE — PROGRESS NOTES
Pt was active in the milieu and attended groups. He denies all mental health symptoms, SI and SIB. He reports feeling good, just waiting on treatment.        08/20/18 1439   Behavioral Health   Hallucinations denies / not responding to hallucinations   Thinking intact   Orientation person: oriented;place: oriented;date: oriented;time: oriented   Memory baseline memory   Insight admits / accepts   Judgement intact   Eye Contact at examiner   Affect full range affect   Mood mood is calm   Physical Appearance/Attire neat   Hygiene well groomed   Suicidality other (see comments)  (pt denied )   1. Wish to be Dead No   2. Non-Specific Active Suicidal Thoughts  No   Self Injury other (see comment)  (pt denied )   Elopement (none stated or observed )   Activity other (see comment)  (active in the milieu, attending groups )   Speech clear;coherent   Medication Sensitivity no stated side effects;no observed side effects   Psychomotor / Gait balanced;steady   Activities of Daily Living   Hygiene/Grooming independent   Oral Hygiene independent   Dress independent   Laundry unable to complete   Room Organization independent

## 2018-08-20 NOTE — PROGRESS NOTES
[facility], [place]   Psychiatric Progress Note      Impression:   This is a 23 year old male admitted for SI, out of control behaviors, aggression and s/p suicide attempt. After he was brought to ER, he became violent, and wrapped bedding around his neck.  We are adjusting medications to target mood.  We are also working with the patient on therapeutic skill building.           Diagnoses and Plan:   Principal Diagnosis: Polysubstance use,   Unspecified Depressive disorder, rule out substance-induced mood disorder  Unit: Station 10  Attending: Memo  Medications: risks/benefits discussed with patient  - Lexapro 10mg daily  Laboratory/Imaging:  - UDS neg, COMP wnl, CBC wnl, TSH wnl and lipids wnl   - HepC Ab neg, HIV Antigen antibody combo neg, Treponema ab neg, HepB surface Ab neg  -ROBBIE 0.235  Consults:  - CD assessment on Monday, 8/20  Patient will be treated in therapeutic milieu with appropriate individual and group therapies as described.  Secondary psychiatric diagnoses of concern this admission:  Rule out Cluster B personality     Medical diagnoses to be addressed this admission:   Asthma      Legal Status: Voluntary     Safety Assessment:   Checks: Status 15  Precautions: Suicide  Pt has not required locked seclusion or restraints in the past 24 hours to maintain safety, please refer to RN documentation for further details.    The risks, benefits, alternatives and side effects have been discussed and are understood by the patient and other caregivers.   Target symptoms to stabilize: SI, aggression and disordered eating  Target disposition: We are currently awaiting recommendation the CD .  MICD faciliity Pt agrees to go to treatment        Attestation:  Patient has been seen and evaluated by meWilmer MD          Interim History:   The patient's care was discussed with the treatment team and chart notes were reviewed.    Pt had rule 25 assessment this  "morning. We are currently awaiting recommendation.   Patient reports that withdrawal symptoms slowly started kicking in, meaning, he feels restless and he has to keep walking, fine tremor in right hand.   He feels zoned out. About SI, he says if he dies now, he would not mind, but he will not kill himself. He denies problem with Lexapro,   He sleeps pretty well, although he wakes up a few times.   He remains cooperative on the unit.  Vital signs have been WNL.      The 10 point Review of Systems is negative other than noted in the HPI         Medications:       escitalopram  10 mg Oral Daily     haloperidol lactate  5 mg Intramuscular Once     nicotine  1 patch Transdermal Daily     nicotine   Transdermal Q8H     nicotine   Transdermal Daily             Allergies:     Allergies   Allergen Reactions     Animal Dander      Other [Seasonal Allergies]             Psychiatric Examination:   /59  Pulse 61  Temp 98.4  F (36.9  C) (Oral)  Resp 16  Ht 1.727 m (5' 8\")  Wt 56.5 kg (124 lb 9.6 oz)  SpO2 97%  BMI 18.95 kg/m2  Weight is 124 lbs 9.6 oz  Body mass index is 18.95 kg/(m^2).    Appearance:  awake, alert, adequately groomed, dressed in hospital scrubs, appeared as age stated, cooperative and no apparent distress  Attitude:  cooperative  Eye Contact:  fair  Mood:  restless  Affect:  mood congruent  Speech:  clear, coherent  Psychomotor Behavior:  no evidence of tardive dyskinesia, dystonia, or tics and intact station, gait and muscle tone fine tremor observed in right hand  Thought Process:  linear  Associations:  no loose associations  Thought Content:  no evidence of suicidal ideation or homicidal ideation, no evidence of psychotic thought, no auditory hallucinations present and no visual hallucinations present  Insight:  fair  Judgment:  limited  Oriented to:  time, person, and place  Attention Span and Concentration:  fair  Recent and Remote Memory:  fair  Language: Able to name objects  Fund of " Knowledge: appropriate  Muscle Strength and Tone: normal  Gait and Station: Normal         Labs:   No results found for this or any previous visit (from the past 24 hour(s)).

## 2018-08-20 NOTE — PLAN OF CARE
"Problem: Occupational Therapy Goals (Adult)  Goal: Occupational Therapy Goals  Pt will demonstrate an increase in attention as evidenced by remaining present in group room for full duration of group while engaged in a task or group discussion within 1 week.       Current OT Goal: Pt will demonstrate an increase in attention as evidenced by remaining present in group room for full duration of group while engaged in a task or group discussion within 1 week.   Pt attended 3 out of 3 OT groups offered. Pt actively participated in occupational therapy clinic. Pt was able to ask for assistance as needed, and independently initiate a familiar, creative expression task without difficulty. Pt demonstrated good planning during task completion, however appeared to have difficulty with concentration as evidenced by frequently exiting/returning to group room and taking frequent breaks from task completion while present in group room. Pt indicated to writer that he commonly \"loses motivation\" to complete tasks/activities. Pt appeared comfortable interacting with peers and writer and socialized throughout clinic. Pt attended an OT group with a focus on a social leisure task. Pt was able to follow 2 step directions of a novel task without difficulty. Pt's affect appeared to brighten throughout group, evident in frequent laughter with peers. Pt was pleasant and engaged with a congruent affect this date. Writer will continue to encourage Pt to remain in group room for full duration unless absolutely necessary to take break (use bathroom, receive scheduled meds, etc.), in order to work towards achieving OT goal of increasing attention and concentration during scheduled groups.               "

## 2018-08-21 PROCEDURE — 99232 SBSQ HOSP IP/OBS MODERATE 35: CPT | Performed by: PSYCHIATRY & NEUROLOGY

## 2018-08-21 PROCEDURE — 25000132 ZZH RX MED GY IP 250 OP 250 PS 637: Performed by: NURSE PRACTITIONER

## 2018-08-21 PROCEDURE — 97127 ZZHC OT THERAPEUTIC INTERVENTION W/FOCUS ON COGNITIVE FUNCTION,EA 15 MIN: CPT | Mod: GO

## 2018-08-21 PROCEDURE — 12400007 ZZH R&B MH INTERMEDIATE UMMC

## 2018-08-21 PROCEDURE — G0177 OPPS/PHP; TRAIN & EDUC SERV: HCPCS

## 2018-08-21 RX ORDER — DIPHENHYDRAMINE HCL 25 MG
25-50 CAPSULE ORAL EVERY 4 HOURS PRN
Status: DISCONTINUED | OUTPATIENT
Start: 2018-08-21 | End: 2018-08-29 | Stop reason: HOSPADM

## 2018-08-21 RX ADMIN — HYDROXYZINE HYDROCHLORIDE 50 MG: 25 TABLET ORAL at 20:26

## 2018-08-21 RX ADMIN — ESCITALOPRAM OXALATE 10 MG: 10 TABLET ORAL at 08:31

## 2018-08-21 RX ADMIN — TRAZODONE HYDROCHLORIDE 50 MG: 50 TABLET ORAL at 20:26

## 2018-08-21 ASSESSMENT — ACTIVITIES OF DAILY LIVING (ADL)
ORAL_HYGIENE: INDEPENDENT
GROOMING: INDEPENDENT
LAUNDRY: WITH SUPERVISION
DRESS: INDEPENDENT

## 2018-08-21 NOTE — PROGRESS NOTES
[facility], [place]   Psychiatric Progress Note      Impression:   This is a 23 year old male admitted for SI, out of control behaviors, aggression and s/p suicide attempt. After he was brought to ER, he became violent, and wrapped bedding around his neck.  We are adjusting medications to target mood.  We are also working with the patient on therapeutic skill building.            Diagnoses and Plan:   Principal Diagnosis: Polysubstance use,   Unspecified Depressive disorder, rule out substance-induced mood disorder  Unit: Station 10  Attending: Memo  Medications: risks/benefits discussed with patient  - Lexapro 10mg daily  Prn hydroxyzine for anxiety  Laboratory/Imaging:  - UDS neg, COMP wnl, CBC wnl, TSH wnl and lipids wnl   - HepC Ab neg, HIV Antigen antibody combo neg, Treponema ab neg, HepB surface Ab neg  -ROBBIE 0.235  Consults:  - CD assessment on Monday, 8/20  Patient will be treated in therapeutic milieu with appropriate individual and group therapies as described.  Secondary psychiatric diagnoses of concern this admission:  Rule out Cluster B personality      Medical diagnoses to be addressed this admission:   Asthma       Legal Status: Voluntary      Safety Assessment:   Checks: Status 15  Precautions: Suicide  Pt has not required locked seclusion or restraints in the past 24 hours to maintain safety, please refer to RN documentation for further details.    The risks, benefits, alternatives and side effects have been discussed and are understood by the patient and other caregivers.   Target symptoms to stabilize: SI, aggression and disordered eating  Target disposition: We are currently awaiting to hear from [facility].    Attestation:  Patient has been seen and evaluated by me,  Wilmer Hampton MD          Interim History:   The patient's care was discussed with the treatment team and chart notes were reviewed.    He says that he had urge to drink  "yesterday, but resisted it and went to bed early. Also prn medication hydroxyzine helped with anxiety. He was told that currently we are awaiting to hear from [facility].   He says that he will be here and wait for it.   He is eating ok. He slept well.         The 10 point Review of Systems is negative other than noted in the HPI         Medications:       escitalopram  10 mg Oral Daily     haloperidol lactate  5 mg Intramuscular Once             Allergies:     Allergies   Allergen Reactions     Animal Dander      Other [Seasonal Allergies]             Psychiatric Examination:   /73  Pulse 69  Temp 98.4  F (36.9  C) (Oral)  Resp 16  Ht 1.727 m (5' 8\")  Wt 57 kg (125 lb 11.2 oz)  SpO2 97%  BMI 19.11 kg/m2  Weight is 125 lbs 11.2 oz  Body mass index is 19.11 kg/(m^2).    Appearance:  awake, alert, adequately groomed, dressed in hospital scrubs, appeared as age stated, cooperative and no apparent distress  Attitude:  cooperative  Eye Contact:  fair  Mood:  anxious  Affect:  mood congruent  Speech:  clear, coherent  Psychomotor Behavior:  no evidence of tardive dyskinesia, dystonia, or tics and intact station, gait and muscle tone  Thought Process:  linear  Associations:  no loose associations  Thought Content:  no evidence of suicidal ideation or homicidal ideation, no evidence of psychotic thought, no auditory hallucinations present and no visual hallucinations present  Insight:  fair  Judgment:  fair  Oriented to:  time, person, and place  Attention Span and Concentration:  fair  Recent and Remote Memory:  fair  Language: Able to name objects  Fund of Knowledge: appropriate  Muscle Strength and Tone: normal  Gait and Station: Normal         Labs:   No results found for this or any previous visit (from the past 24 hour(s)).  "

## 2018-08-21 NOTE — PROGRESS NOTES
Writer spoke with Keila CD  from [facility] ([phone]) she reported CD evaluation went well, and it will be sent to [facility] for review.     Writer spoke with Jess at [facility] ([phone]) they have not received Pt's CD evaluation as of today, Writer asked to be contacted once it has been received and reviewed. Writer will plan on checking in with [facility] tomorrow morning.

## 2018-08-21 NOTE — PROGRESS NOTES
Pt comes to staff and reports he has a rash. Writer assessed rash.  Rash is on the inside of the right elbow. Area is light red and has scratch marks on it.  Pt states this also happens on the other elbow and the back of his knees. No pain but is itchy.  States this has been going for about 6 months. Pt states he occassionally places steroid cream on it.  Pt last shower was 2 days ago. Encouraged pt to shower and and use soap.  Writer paged  Waiting for call back.

## 2018-08-21 NOTE — PLAN OF CARE
"Problem: Occupational Therapy Goals (Adult)  Goal: Occupational Therapy Goals  Pt will demonstrate an increase in attention as evidenced by remaining present in group room for full duration of group while engaged in a task or group discussion within 1 week.        Current OT Goal: Pt will demonstrate an increase in attention as evidenced by remaining present in group room for full duration of group while engaged in a task or group discussion within 1 week.  Pt attended 3 out of 3 OT groups offered. Pt actively participated in occupational therapy clinic. Pt was able to ask for assistance as needed, and independently initiate a familiar, creative expression task without difficulty. Pt demonstrated good focus, planning, and problem solving for full duration of clinic. Pt appeared comfortable interacting with peers and writer and socialized throughout group. Pt actively participated in an occupational therapy group with a discussion focused on various mental health topics, including mental health management, social situations, healthy support systems, healthy mind/body, and activities of daily living. Pt responded to prompts thoughtfully and insightfully and was respectful in listening and responding to peers. Pt actively participated in a mental health management group focused on learning to cope and self soothe through the 5 senses. After initial sensory education was provided by writer, Pt was able to independently identify his preferred methods of using sight, hearing, taste, touch, and smell to calm self as needed. Pt completed approximately 60% of activity independently, and identified \"colors, music, mint, rubbing my arm, and the smell of vanilla\" as his favorite 5 methods of sensory input. Pt was pleasant and engaged with a congruent affect during OT groups this date. Pt achieved current OT goal this date as he demonstrated his ability to maintain overall attention to a task/group discussion as evidenced by " remaining engaged and present in group room for 3/3 OT groups attended this date.

## 2018-08-21 NOTE — PLAN OF CARE
"Problem: Impaired Control (Excessive Substance Use) (Adult)  Intervention: Promote Optimal Psychological Functioning  48 hour nursing assessment:  Pt evaluation continues. Assessed mood, anxiety, thoughts, and behavior. Is progressing towards goals. Encouraged participation in groups and developing healthy coping skills. Pt states he is\" attending all groups to encourage and support others to participate and to act as a role model.\" Has demonstrated a supportive attitude toward other patients on unit that have had difficulties ., Awaiting placement Pt denies auditory or visual  hallucinations. Refer to daily team meeting notes for individualized plan of care. Will continue to assess.        "

## 2018-08-22 PROCEDURE — 99207 ZZC CONSULT E&M CHANGED TO SUBSEQUENT LEVEL: CPT | Performed by: PHYSICIAN ASSISTANT

## 2018-08-22 PROCEDURE — 97127 ZZHC OT THERAPEUTIC INTERVENTION W/FOCUS ON COGNITIVE FUNCTION,EA 15 MIN: CPT | Mod: GO

## 2018-08-22 PROCEDURE — 12400007 ZZH R&B MH INTERMEDIATE UMMC

## 2018-08-22 PROCEDURE — G0177 OPPS/PHP; TRAIN & EDUC SERV: HCPCS

## 2018-08-22 PROCEDURE — 99232 SBSQ HOSP IP/OBS MODERATE 35: CPT | Performed by: PSYCHIATRY & NEUROLOGY

## 2018-08-22 PROCEDURE — 25000132 ZZH RX MED GY IP 250 OP 250 PS 637: Performed by: PHYSICIAN ASSISTANT

## 2018-08-22 PROCEDURE — 25000132 ZZH RX MED GY IP 250 OP 250 PS 637: Performed by: NURSE PRACTITIONER

## 2018-08-22 PROCEDURE — 99231 SBSQ HOSP IP/OBS SF/LOW 25: CPT | Performed by: PHYSICIAN ASSISTANT

## 2018-08-22 RX ORDER — HYDROCORTISONE 2.5 %
CREAM (GRAM) TOPICAL 2 TIMES DAILY
Status: DISCONTINUED | OUTPATIENT
Start: 2018-08-22 | End: 2018-08-29 | Stop reason: HOSPADM

## 2018-08-22 RX ADMIN — HYDROCORTISONE: 25 CREAM TOPICAL at 17:02

## 2018-08-22 RX ADMIN — ESCITALOPRAM OXALATE 10 MG: 10 TABLET ORAL at 09:29

## 2018-08-22 RX ADMIN — HYDROXYZINE HYDROCHLORIDE 50 MG: 25 TABLET ORAL at 17:25

## 2018-08-22 RX ADMIN — TRAZODONE HYDROCHLORIDE 50 MG: 50 TABLET ORAL at 21:13

## 2018-08-22 ASSESSMENT — ACTIVITIES OF DAILY LIVING (ADL)
ORAL_HYGIENE: INDEPENDENT
DRESS: SCRUBS (BEHAVIORAL HEALTH)
ORAL_HYGIENE: INDEPENDENT
DRESS: INDEPENDENT
LAUNDRY: WITH SUPERVISION
GROOMING: INDEPENDENT
GROOMING: INDEPENDENT

## 2018-08-22 NOTE — PLAN OF CARE
Problem: Occupational Therapy Goals (Adult)  Goal: Occupational Therapy Goals  Pt will demonstrate an increase in attention as evidenced by remaining present in group room for full duration of group while engaged in a task or group discussion within 1 week. (Goal met)        Pt actively participated in occupational therapy clinic. Overall affect/behavior/cognition: congruent and engaged. Pt demonstrated good focus, planning, and problem solving. Performance skills: Able to independently initiate self-selected task, gather materials, sequence multi-step task, and adjust to workspace demands as needed. Social Interaction: Able to ask for assistance as needed and appeared comfortable interacting with peers and staff.

## 2018-08-22 NOTE — PROGRESS NOTES
Writer spoke with Jess olivas at [facility] ([phone]) she reported they have not received CD evaluation, and they will contact Keila at [facility] who completed the CD evaluation to see where it is at.

## 2018-08-22 NOTE — PROGRESS NOTES
Pt continues to complain of rash inner elbow. States is itchy. Benadryl offered, pt refused stating it would make him too sleepy. Will continue to monitor.

## 2018-08-22 NOTE — CONSULTS
"Abdulaziz Mart is a 23 year old amle with a hx of alcohol abuse, depression, and SI who was admitted to [facility] station [facility] due to agression and suicide attempt in the ED. IM was asked to see this patient to evaluate rash in bilateral elbows.    S: Rash has been ongoing for >6 months. Diagnoses with eczema by PCP and prescribed steroid treatment which he responded well to. Has not had his stroid cream in ~ 3 months and eczema is subsequently worsening. Symptoms include pruritis in bilateral antecubitals, R >L.    O:  PE-   General: Alert and oriented x 3, NAD  HEENT: Head normocephalic, atraumatic  Chest: Lungs CTAB bilaterally, no wheezing, rales or rhonchi  C/V: RRR, no murmurs or gallops, skin well perfused  Skin: Erythematous, raised patch on R antecubital, faint erythema on L antecubital    Vital signs:  Temp: 96.3  F (35.7  C) Temp src: Oral BP: 127/78 Pulse: 96           Height: 172.7 cm (5' 8\") Weight: 57 kg (125 lb 11.2 oz)  Estimated body mass index is 19.11 kg/(m^2) as calculated from the following:    Height as of this encounter: 1.727 m (5' 8\").    Weight as of this encounter: 57 kg (125 lb 11.2 oz).      A: Eczema of bilateral antecubitals    P: Hydrocortisone cream 2.5%, apply to bilateral antecubitals BID x 3 weeks       [place] application of emollients (ie petroleum jelly) to antecubitals throughout the day as needed          IM will sign off at this time. Please notify if any intercurrent medical questions or concerns arise.       Yumiko Tavera PA-C  Hospitalist Service  [phone]      "

## 2018-08-22 NOTE — PROGRESS NOTES
"Pt says, \"I'm a little agitated because the withdrawal symptoms are starting to get worse especially at night I start to get really agitated. My concentration is in and out like I can watch TV but I'll tune out here and there with the racing thoughts.\" He denies SI, SIB, depression, anxiety, pain, and psychotic symptoms. He states his sleep and appetite are \"fine\". He was present in the milieu during the day and watched TV on his free time. He attended all groups. He showered and completed laundry with supervision during the day.       08/22/18 1408   Behavioral Health   Hallucinations denies / not responding to hallucinations   Thinking intact   Orientation person: oriented;place: oriented;date: oriented;time: oriented   Memory baseline memory   Insight admits / accepts   Judgement intact   Eye Contact at examiner   Affect full range affect   Mood mood is calm   Physical Appearance/Attire neat   Hygiene well groomed   Suicidality other (see comments)  (denies)   1. Wish to be Dead No   2. Non-Specific Active Suicidal Thoughts  No   Self Injury other (see comment)  (denies)   Elopement Statements about wanting to leave   Activity other (see comment)  (attending groups, present in milieu, social with other pts)   Speech clear;coherent   Psychomotor / Gait balanced;steady   Activities of Daily Living   Hygiene/Grooming independent   Oral Hygiene independent   Dress scrubs (behavioral health)   Laundry with supervision   Room Organization independent     "

## 2018-08-22 NOTE — PROGRESS NOTES
[facility], [place]   Psychiatric Progress Note      Impression:   This is a 23 year old male admitted for SI, out of control behaviors, aggression and s/p suicide attempt. After he was brought to ER, he became violent, and wrapped bedding around his neck.  We are adjusting medications to target mood.  We are also working with the patient on therapeutic skill building.             Diagnoses and Plan:   Principal Diagnosis: Polysubstance use,   Unspecified Depressive disorder, rule out substance-induced mood disorder  Unit: Station 10  Attending: Memo  Medications: risks/benefits discussed with patient  - Lexapro 10mg daily  Prn hydroxyzine for anxiety  Laboratory/Imaging:  - UDS neg, COMP wnl, CBC wnl, TSH wnl and lipids wnl   - HepC Ab neg, HIV Antigen antibody combo neg, Treponema ab neg, HepB surface Ab neg  -ROBBIE 0.235  Consults  - CD assessment ( finished on 8/20)  - Internal medicine - to assess the rash on right elbow -  Patient will be treated in therapeutic milieu with appropriate individual and group therapies as described.  Secondary psychiatric diagnoses of concern this admission:  Rule out Cluster B personality      Medical diagnoses to be addressed this admission:   Asthma       Legal Status: Voluntary      Safety Assessment:   Checks: Status 15  Precautions: Suicide  Pt has not required locked seclusion or restraints in the past 24 hours to maintain safety, please refer to RN documentation for further details.    The risks, benefits, alternatives and side effects have been discussed and are understood by the patient and other caregivers.   Target symptoms to stabilize: SI, aggression and disordered eating  Target disposition: We are currently awaiting to hear from [facility].      Attestation:  Patient has been seen and evaluated by me,  Wilmer Hampton MD          Interim History:   The patient's care was discussed with the treatment team and chart  "notes were reviewed.    Staff reported that the pt refused Benadryl for the rash on right elbow. Writer saw the rash, the pt reported that he has had this for a while, He kept taking Benadryl , but it keeps getting worse and bigger. He was told that this is fungal, but was not given antifungal.   Otherwise, he is just waiting to hear from [facility]. He denies SI. He denies psychosis. He continues to express that he wants to go to treatment.       The 10 point Review of Systems is negative other than noted in the HPI         Medications:       escitalopram  10 mg Oral Daily     haloperidol lactate  5 mg Intramuscular Once             Allergies:     Allergies   Allergen Reactions     Animal Dander      Other [Seasonal Allergies]             Psychiatric Examination:   /78  Pulse 96  Temp 96.3  F (35.7  C) (Oral)  Resp 16  Ht 1.727 m (5' 8\")  Wt 57 kg (125 lb 11.2 oz)  SpO2 97%  BMI 19.11 kg/m2  Weight is 125 lbs 11.2 oz  Body mass index is 19.11 kg/(m^2).    Appearance:  awake, alert, adequately groomed, dressed in hospital scrubs, appeared as age stated, cooperative and no apparent distress  Attitude:  cooperative  Eye Contact:  fair  Mood:  euthymic  Affect:  mood congruent  Speech:  clear, coherent  Psychomotor Behavior:  no evidence of tardive dyskinesia, dystonia, or tics and intact station, gait and muscle tone  Thought Process:  linear  Associations:  no loose associations  Thought Content:  no evidence of suicidal ideation or homicidal ideation, no evidence of psychotic thought, no auditory hallucinations present and no visual hallucinations present  Insight:  fair  Judgment:  fair  Oriented to:  time, person, and place  Attention Span and Concentration:  fair  Recent and Remote Memory:  fair  Language: Able to name objects  Fund of Knowledge: appropriate  Muscle Strength and Tone: normal  Gait and Station: Normal         Labs:   No results found for this or any previous visit (from the past 24 " hour(s)).

## 2018-08-23 PROCEDURE — 97127 ZZHC OT THERAPEUTIC INTERVENTION W/FOCUS ON COGNITIVE FUNCTION,EA 15 MIN: CPT | Mod: GO

## 2018-08-23 PROCEDURE — G0177 OPPS/PHP; TRAIN & EDUC SERV: HCPCS

## 2018-08-23 PROCEDURE — 25000132 ZZH RX MED GY IP 250 OP 250 PS 637: Performed by: NURSE PRACTITIONER

## 2018-08-23 PROCEDURE — 12400007 ZZH R&B MH INTERMEDIATE UMMC

## 2018-08-23 RX ADMIN — ESCITALOPRAM OXALATE 10 MG: 10 TABLET ORAL at 08:53

## 2018-08-23 RX ADMIN — HYDROXYZINE HYDROCHLORIDE 50 MG: 25 TABLET ORAL at 12:59

## 2018-08-23 RX ADMIN — HYDROCORTISONE: 25 CREAM TOPICAL at 19:28

## 2018-08-23 RX ADMIN — TRAZODONE HYDROCHLORIDE 50 MG: 50 TABLET ORAL at 21:07

## 2018-08-23 RX ADMIN — HYDROCORTISONE: 25 CREAM TOPICAL at 08:53

## 2018-08-23 RX ADMIN — HYDROXYZINE HYDROCHLORIDE 50 MG: 25 TABLET ORAL at 19:27

## 2018-08-23 ASSESSMENT — ACTIVITIES OF DAILY LIVING (ADL)
LAUNDRY: UNABLE TO COMPLETE
ORAL_HYGIENE: INDEPENDENT
ORAL_HYGIENE: INDEPENDENT
DRESS: STREET CLOTHES
GROOMING: INDEPENDENT
DRESS: INDEPENDENT
LAUNDRY: WITH SUPERVISION
GROOMING: INDEPENDENT

## 2018-08-23 NOTE — PROGRESS NOTES
Writer spoke with Jess from [facility], they have received Pt's CD evaluation, insurance coverage is being worked out with [employer] federal employee plan. Jess asked for medical records to be faxed to them at [phone]. Writer did this.

## 2018-08-23 NOTE — PROGRESS NOTES
08/22/18 2206   Behavioral Health   Hallucinations denies / not responding to hallucinations   Thinking intact   Orientation person: oriented;place: oriented   Memory baseline memory   Insight insight appropriate to situation   Judgement intact   Eye Contact at examiner   Affect full range affect   Mood mood is calm   Physical Appearance/Attire attire appropriate to age and situation   Hygiene well groomed   Suicidality other (see comments)  (Pt Denies)   1. Wish to be Dead No   2. Non-Specific Active Suicidal Thoughts  No   Self Injury other (see comment)  (Pt Denies)   Elopement (None)   Activity other (see comment)  (Social in milieu)   Speech clear;coherent   Psychomotor / Gait balanced;steady   Psycho Education   Type of Intervention 1:1 intervention   Response participates with encouragement   Hours 0.5   Activities of Daily Living   Hygiene/Grooming independent   Oral Hygiene independent   Dress independent   Room Organization independent     Pt was calm and cooperative throughout the night. Pt spent most of evening in lounge watching TV with peers. Pt stated that he was experiencing withdrawals from [place]hol, but was not in pain. Pt is looking forward to discharge so he can begin the treatment process.

## 2018-08-23 NOTE — PLAN OF CARE
"Problem: Occupational Therapy Goals (Adult)  Goal: Occupational Therapy Goals  -Pt will demonstrate an increase in attention as evidenced by remaining present in group room for full duration of group while engaged in a task or group discussion within 1 week. (Goal met)  -Pt will independently identify 3 coping skills to utilize as needed upon discharge within 1 week.       Pt attended 3 out of 3 OT groups offered. Pt actively participated in a structured occupational therapy group with a focus on connecting song titles to life events and personal feelings. Using a list of song titles, pt identified \"After Midnight, All By Myself, I'm Sailing Away, Behind Blue Eyes, The Gambler, and Yesterday All My Troubles Were So Far Away\" as song titles that relate to their life, as well as \"I Can See Clearly Now\" as a song title that indicates something about their future. Pt appeared to brighten during group and was able to independently identify that music is an effective coping skill he utilizes outside of the hospital. Pt actively participated in occupational therapy clinic. Pt was able to ask for assistance as needed, and independently initiate a familiar, goal-directed task without diffiuclty. Pt demonstrated good focus, planning, and problem solving for full duration of clinic. Pt appeared comfortable interacting with peers and writer and consistently engaged in group conversation. Pt actively participated in a mental health management group focused on social interaction and problem-solving through a group task. Pt appeared comfortable interacting with peers and was observed to consistently assist peers to succeed during task throughout group. Pt was observed to brighten with social interaction. Overall, Pt was pleasant and engaged with a bright affect during OT groups this date.         "

## 2018-08-23 NOTE — PLAN OF CARE
"Problem: Patient Care Overview  Goal: Plan of Care/Patient Progress Review  Outcome: No Change    Patient has had an unremarkable shift. He did shower today. He reports that his elbows are starting to feel better and don't itch anymore with his cream. He is pleasant and cooperative. He did request PRN hydroxyzine for anxiety and rates it a 7/10. When asked if he feels depressed he said \"only a little.\" He says his goal today was to find out his next step for discharge, which he did and is a phone interview with his tx center. Patient denies all SI/SIB. No other concerns at this time. Will continue to monitor and assess.      "

## 2018-08-24 PROCEDURE — 99231 SBSQ HOSP IP/OBS SF/LOW 25: CPT | Performed by: PSYCHIATRY & NEUROLOGY

## 2018-08-24 PROCEDURE — 25000132 ZZH RX MED GY IP 250 OP 250 PS 637: Performed by: NURSE PRACTITIONER

## 2018-08-24 PROCEDURE — 12400007 ZZH R&B MH INTERMEDIATE UMMC

## 2018-08-24 PROCEDURE — 97127 ZZHC OT THERAPEUTIC INTERVENTION W/FOCUS ON COGNITIVE FUNCTION,EA 15 MIN: CPT | Mod: GO

## 2018-08-24 RX ORDER — ESCITALOPRAM OXALATE 10 MG/1
10 TABLET ORAL DAILY
Qty: 30 TABLET | Refills: 0 | Status: SHIPPED | OUTPATIENT
Start: 2018-08-25 | End: 2018-09-29 | Stop reason: ALTCHOICE

## 2018-08-24 RX ORDER — HYDROCORTISONE 2.5 %
CREAM (GRAM) TOPICAL 2 TIMES DAILY
Qty: 20 G | Refills: 0 | Status: SHIPPED | OUTPATIENT
Start: 2018-08-24

## 2018-08-24 RX ORDER — ALBUTEROL SULFATE 90 UG/1
2 AEROSOL, METERED RESPIRATORY (INHALATION) EVERY 4 HOURS PRN
Qty: 1 INHALER | Refills: 0 | Status: SHIPPED | OUTPATIENT
Start: 2018-08-24

## 2018-08-24 RX ADMIN — ESCITALOPRAM OXALATE 10 MG: 10 TABLET ORAL at 09:07

## 2018-08-24 RX ADMIN — HYDROXYZINE HYDROCHLORIDE 50 MG: 25 TABLET ORAL at 21:23

## 2018-08-24 RX ADMIN — HYDROCORTISONE: 25 CREAM TOPICAL at 17:07

## 2018-08-24 RX ADMIN — TRAZODONE HYDROCHLORIDE 50 MG: 50 TABLET ORAL at 21:23

## 2018-08-24 RX ADMIN — HYDROXYZINE HYDROCHLORIDE 50 MG: 25 TABLET ORAL at 17:07

## 2018-08-24 ASSESSMENT — ACTIVITIES OF DAILY LIVING (ADL)
HYGIENE/GROOMING: INDEPENDENT
LAUNDRY: WITH SUPERVISION
ORAL_HYGIENE: INDEPENDENT
DRESS: SCRUBS (BEHAVIORAL HEALTH);INDEPENDENT
LAUNDRY: WITH SUPERVISION
ORAL_HYGIENE: INDEPENDENT
GROOMING: INDEPENDENT
DRESS: STREET CLOTHES;INDEPENDENT

## 2018-08-24 NOTE — PROGRESS NOTES
Writer spoke with Jess bennett  at [facility] ([phone]) the final step before admission is a prior authorization interview with one of their CD counselors, unfortunately no one can complete one today. Scheduled phone prior authorization for Saturday, August 25th at 9:00 am, Belle will call the unit to speak with Pt. Informed Pt and added information to brain board.

## 2018-08-24 NOTE — PROGRESS NOTES
[facility], [place]   Psychiatric Progress Note      Impression:   This is a 23 year old male admitted for SI, out of control behaviors, aggression and s/p suicide attempt. After he was brought to ER, he became violent, and wrapped bedding around his neck.  We are adjusting medications to target mood.  We are also working with the patient on therapeutic skill building.             Diagnoses and Plan:   Principal Diagnosis: Polysubstance use,   Unspecified Depressive disorder, rule out substance-induced mood disorder  Unit: Station 10  Attending: Memo  Medications: risks/benefits discussed with patient  - Lexapro 10mg daily  Prn hydroxyzine for anxiety  Laboratory/Imaging:  - UDS neg, COMP wnl, CBC wnl, TSH wnl and lipids wnl   - HepC Ab neg, HIV Antigen antibody combo neg, Treponema ab neg, HepB surface Ab neg  -ROBBIE 0.235  Consults  - CD assessment ( finished on 8/20)  - Internal medicine - to assess the rash on right elbow -  Patient will be treated in therapeutic milieu with appropriate individual and group therapies as described.  Secondary psychiatric diagnoses of concern this admission:  Rule out Cluster B personality      Medical diagnoses to be addressed this admission:   Asthma       Legal Status: Voluntary      Safety Assessment:   Checks: Status 15  Precautions: Suicide  Pt has not required locked seclusion or restraints in the past 24 hours to maintain safety, please refer to RN documentation for further details.    The risks, benefits, alternatives and side effects have been discussed and are understood by the patient and other caregivers.   Target symptoms to stabilize: SI, aggression and disordered eating  Target disposition: We are currently awaiting the placement at [facility].   Tomorrow, pt will have interview with the facility         Attestation:  Patient has been seen and evaluated by me,  Wilmer Hampton MD          Interim History:   The patient's care was  "discussed with the treatment team and chart notes were reviewed.    Pt says that feeling restless continues as a withdrawal. But he maintains calm.   He is just waiting to go to [facility].   He is eating and sleeping fine.       The 10 point Review of Systems is negative other than noted in the HPI         Medications:       escitalopram  10 mg Oral Daily     haloperidol lactate  5 mg Intramuscular Once     hydrocortisone   Topical BID             Allergies:     Allergies   Allergen Reactions     Animal Dander      Other [Seasonal Allergies]             Psychiatric Examination:   /81  Pulse 88  Temp 97.3  F (36.3  C) (Tympanic)  Resp 16  Ht 1.727 m (5' 8\")  Wt 59.1 kg (130 lb 6.4 oz)  SpO2 97%  BMI 19.83 kg/m2  Weight is 130 lbs 6.4 oz  Body mass index is 19.83 kg/(m^2).    Appearance:  awake, alert, adequately groomed, dressed in hospital scrubs, appeared as age stated, cooperative and no apparent distress  Attitude:  cooperative  Eye Contact:  fair  Mood:  better  Affect:  mood congruent  Speech:  clear, coherent  Psychomotor Behavior:  no evidence of tardive dyskinesia, dystonia, or tics and intact station, gait and muscle tone  Thought Process:  linear  Associations:  no loose associations  Thought Content:  no evidence of suicidal ideation or homicidal ideation, no evidence of psychotic thought, no auditory hallucinations present and no visual hallucinations present  Insight:  fair  Judgment:  limited  Oriented to:  time, person, and place  Attention Span and Concentration:  fair  Recent and Remote Memory:  fair  Language: Able to name objects  Fund of Knowledge: appropriate  Muscle Strength and Tone: normal  Gait and Station: Normal         Labs:   No results found for this or any previous visit (from the past 24 hour(s)).  "

## 2018-08-24 NOTE — PLAN OF CARE
"Problem: Occupational Therapy Goals (Adult)  Goal: Occupational Therapy Goals  -Pt will demonstrate an increase in attention as evidenced by remaining present in group room for full duration of group while engaged in a task or group discussion within 1 week. (Goal met)  -Pt will independently identify 3 coping skills to utilize as needed upon discharge within 1 week.          Pt attended 3 out of 3 OT groups offered. Pt actively participated in occupational therapy clinic. Pt was able to ask for assistance as needed, and independently initiate a familiar, goal-directed task without difficulty. Pt demonstrated good focus, planning, and problem solving for full duration of clinic. Pt appeared comfortable interacting with peers and writer and initiated group conversation frequently throughout group. Pt actively participated in a structured occupational therapy group involving writing a personal progress note for the week in conjunction with a structured discussion. Pt identified \"drinking, depression, anxiety\" as symptoms and events leading to hospitalization. Pt reported feeling \"alright, better, but ask me tomorrow\". Pt identified \"doing stuff like puzzles, watching TV, listening to music, number puzzles\" as interventions that have worked to manage their symptoms. Pt wrote the following goals for discharge: \"treatment, getting sober, taking my meds, be more active\". Pt appeared insightful into his needs and goals regarding his time on the unit and his discharge goals. Overall, Pt was pleasant and engaged during OT groups this date, and continues to work towards achieving his goal of independently identifying coping skills he can utilize upon discharge.         "

## 2018-08-24 NOTE — PROGRESS NOTES
Pt denied thoughts of S/SIB. Pt denied hallucinations. PT stated having anxiety, but also stated that anxiety is currently manageable with current drugs. Pt attending and participating in groups. Pt active and socializing with staff and peers in the milieu. Pt took shower and brushed teeth in the morning. Pt ate meals throughout the shift.     08/24/18 1023   Behavioral Health   Hallucinations denies / not responding to hallucinations   Thinking intact   Orientation person: oriented;place: oriented;date: oriented;time: oriented   Memory baseline memory   Insight insight appropriate to situation   Judgement intact   Eye Contact at examiner   Affect full range affect   Mood anxious;mood is calm   Physical Appearance/Attire attire appropriate to age and situation   Hygiene well groomed  (Took shower and brushed teeth in morning)   Suicidality other (see comments)  (Denies)   1. Wish to be Dead No   2. Non-Specific Active Suicidal Thoughts  No   Elopement (No concerns)   Activity (active with staff and peers in milieu)   Speech clear;coherent   Psychomotor / Gait balanced;steady   Activities of Daily Living   Hygiene/Grooming independent   Oral Hygiene independent   Dress street clothes;independent   Laundry with supervision   Room Organization independent

## 2018-08-24 NOTE — PLAN OF CARE
Problem: Patient Care Overview  Goal: Team Discussion  Team Plan:   BEHAVIORAL TEAM DISCUSSION    Participants: Dr. Wilmer Hampton MD, Lalita QUIROZ and Betty GARVEY RN   Progress: Pt has been progressing, attends all OT/therapeutic groups, and is cooperative in milieu.   Continued Stay Criteria/Rationale: Pt has been accepted at [facility] for CD treatment, however, insurance/payment needs to be authorized prior to scheduling admission date. Pt will complete an over the phone authorization with counselor tomorrow morning, which will be reviewed by insurance on Monday.   Medical/Physical: See medical consult, if applicable.   Precautions:   Behavioral Orders   Procedures     Code 1 - Restrict to Unit     Routine Programming     As clinically indicated     Status 15     Every 15 minutes.     Plan: Pt will admit to [facility] once it has been authorized by insurance, Pt admission at [facility] likely early next week.   Rationale for change in precautions or plan: No change, continue with plan of care.

## 2018-08-25 PROCEDURE — G0177 OPPS/PHP; TRAIN & EDUC SERV: HCPCS

## 2018-08-25 PROCEDURE — 25000132 ZZH RX MED GY IP 250 OP 250 PS 637: Performed by: NURSE PRACTITIONER

## 2018-08-25 PROCEDURE — 12400007 ZZH R&B MH INTERMEDIATE UMMC

## 2018-08-25 RX ADMIN — HYDROXYZINE HYDROCHLORIDE 50 MG: 25 TABLET ORAL at 12:57

## 2018-08-25 RX ADMIN — HYDROCORTISONE: 25 CREAM TOPICAL at 20:58

## 2018-08-25 RX ADMIN — HYDROCORTISONE: 25 CREAM TOPICAL at 12:57

## 2018-08-25 RX ADMIN — ESCITALOPRAM OXALATE 10 MG: 10 TABLET ORAL at 08:37

## 2018-08-25 RX ADMIN — TRAZODONE HYDROCHLORIDE 50 MG: 50 TABLET ORAL at 20:58

## 2018-08-25 RX ADMIN — HYDROXYZINE HYDROCHLORIDE 50 MG: 25 TABLET ORAL at 20:58

## 2018-08-25 ASSESSMENT — ACTIVITIES OF DAILY LIVING (ADL)
LAUNDRY: WITH SUPERVISION
DRESS: INDEPENDENT
GROOMING: INDEPENDENT
ORAL_HYGIENE: INDEPENDENT

## 2018-08-25 NOTE — PROGRESS NOTES
This writer introduced/greet pt for brief 1:1, pt stated that he had a good day, there was anxiety issues pt talked about it after having medication/calm down, pt denies of SI/SIB/HI, pt's calm/pleasant/controlled behavior, pt's in milieu watched TV/game/social with peers, pt appetite was good ate 100%, pt's independent well groomed/neat/tidy.     08/24/18 2200   Behavioral Health   Thoughts/Cognition (WDL) ex   Hallucinations denies / not responding to hallucinations   Thinking intact   Orientation person: oriented;place: oriented   Memory baseline memory   Insight denial of illness   Judgement impaired   Eye Contact at examiner   Affect/Mood (WDL) Ex.   Affect blunted, flat   Mood mood is calm;other (see comments)  (stated anxious earlier took med/calm.)   Hygiene well groomed   Suicidality (WDL) ex   Suicidality other (see comments)  (none)   1. Wish to be Dead No   Self Injury other (see comment)  (none)   Elopement (WDL) WDL   Elopement (none)   Activities of Daily Living   Hygiene/Grooming independent   Oral Hygiene independent   Dress scrubs (behavioral health);independent   Laundry with supervision   Room Organization independent   Activity   Activity Assistance Provided independent

## 2018-08-25 NOTE — PLAN OF CARE
Problem: Occupational Therapy Goals (Adult)  Goal: Occupational Therapy Goals  -Pt will demonstrate an increase in attention as evidenced by remaining present in group room for full duration of group while engaged in a task or group discussion within 1 week. (Goal met)  -Pt will independently identify 3 coping skills to utilize as needed upon discharge within 1 week.          Attended OT group. Was successful in utilizing more complex solutions with visuospatial problem solving. He was social, appeared patient when a peer tool longer to process information. He talked about having completed tx in the past and looking forward to the possibility of beginning his next CD Tx plan and talked about having some experience with CD Tx to be helpful in working through coping skills. Teased and supported in a pleasant manner with a peer.

## 2018-08-25 NOTE — PLAN OF CARE
"Problem: Mood Impairment (Depressive Signs/Symptoms) (Adult)  Goal: Improved Mood Symptoms (Depressive Signs/Symptoms)  Outcome: Improving  \"I'm feeling pretty good, I'm sober.\"  Indicates is feeling less depressed, rating this at a 4 on a scale with 10 being the worst.  Feels anxiety is less overall, but increased today rating this a 7 on the same scale.  \"I'm nervous about that phone call with Inesbarryjulee.\"  Denies suicidal thoughts, wish to be dead or thoughts of self harm.  States concentration continues to be poor.  \"I always have bad concentration.\"  Continues also to experience racing and ruminating thoughts which are unchanged from time of admission.  States sleep is better as \"I used to sleep 16 hours a day and now I'm sleeping 8 hours, but I feel more rested.\"  When about going to treatment states is \"excited\" to go.  This will be second CD treatment,  is an adult now and \"I'm going to take it seriously.\"  Talked about not liking the 12 step aspect of [facility] and has to think about getting a sponsor,\"but I will do what it takes\".   will live with step father who is sober and supportive upon completion of treatment.  Sister is also supportive, \"but I'll have to scrap all my friends, they all use.\"  Is unable to state how will maintain sobriety specifically.  Staff spoke with him about meeting people in recovery and forming new, healthy relationships.   \"I know I have to keep my self moving forward.\"  Has been visible in milieu, attending all groups today thus far, is social with peers, bright and animated.      "

## 2018-08-25 NOTE — PLAN OF CARE
"Problem: Patient Care Overview  Goal: Individualization & Mutuality  Illness Management Recovery model:  Ways to [place].    Patient identified the following specific strategies to cope with their symptoms:    1. \"I will keep moving forward.\"  2. \"I will have to scrap my old friends.\"  3. \"I will stay active, going to arcades.\"        "

## 2018-08-26 PROCEDURE — 25000132 ZZH RX MED GY IP 250 OP 250 PS 637: Performed by: NURSE PRACTITIONER

## 2018-08-26 PROCEDURE — 12400007 ZZH R&B MH INTERMEDIATE UMMC

## 2018-08-26 RX ADMIN — TRAZODONE HYDROCHLORIDE 50 MG: 50 TABLET ORAL at 21:05

## 2018-08-26 RX ADMIN — HYDROXYZINE HYDROCHLORIDE 50 MG: 25 TABLET ORAL at 16:56

## 2018-08-26 RX ADMIN — ESCITALOPRAM OXALATE 10 MG: 10 TABLET ORAL at 08:18

## 2018-08-26 RX ADMIN — HYDROXYZINE HYDROCHLORIDE 50 MG: 25 TABLET ORAL at 21:05

## 2018-08-26 RX ADMIN — HYDROCORTISONE: 25 CREAM TOPICAL at 18:11

## 2018-08-26 ASSESSMENT — ACTIVITIES OF DAILY LIVING (ADL)
ORAL_HYGIENE: INDEPENDENT
GROOMING: SHOWER;INDEPENDENT;PROMPTS
LAUNDRY: WITH SUPERVISION
LAUNDRY: WITH SUPERVISION
DRESS: STREET CLOTHES
DRESS: SCRUBS (BEHAVIORAL HEALTH);INDEPENDENT
ORAL_HYGIENE: INDEPENDENT
GROOMING: INDEPENDENT

## 2018-08-26 NOTE — PLAN OF CARE
"Problem: Overarching Goals (Adult)  Goal: Develops/Participates in Therapeutic [place] to Support Successful Transition  Outcome: Therapy, progress toward functional goals as expected  Pt reported he is awaiting placement at [facility] in [place], [place].  Said his Stepdad is looking into what [facility] offers.  Wants to manage his chemical dependency so he can start on other goals in life, GED and college.  Stated he is looking forward to [facility] helping him with CD problem. Reported coping skills are sudokos and \"zoning out.\"  Would like to develop a closer relationship with his family. Engaged in milieu and games with others on the unit.  Sylvia Calvillo on 8/26/2018 at 5:27 PM        "

## 2018-08-26 NOTE — PROGRESS NOTES
Pt said he is feeling hopeful about his placement at [facility]. He is now an adult. Pt expressed a bit of anxiety about going     08/26/18 1240   Behavioral Health   Hallucinations denies / not responding to hallucinations   Thinking intact   Orientation person: oriented;place: oriented;date: oriented;time: oriented   Memory baseline memory   Insight insight appropriate to situation   Judgement impaired   Eye Contact at examiner   Affect full range affect   Mood mood is calm   Physical Appearance/Attire neat   Suicidality other (see comments)  (denies)   Self Injury other (see comment)  (denies)   Elopement (no concerns)   Activity restless   Speech clear;coherent   Psychomotor / Gait balanced;steady   Sleep/Rest/Relaxation   Sleep/Rest/Relaxation no problem identified   Safety   Suicidality Status 15   Coping/Psychosocial   Verbalized Emotional State acceptance;anxiety;hopefulness   Psycho Education   Type of Intervention 1:1 intervention   Response participates with cues/redirection   Hours 0.5   Treatment Detail (feeling hopeful)   Activities of Daily Living   Hygiene/Grooming shower;independent;prompts   Oral Hygiene independent   Dress street clothes   Laundry with supervision   Room Organization independent        08/26/18 1240   Behavioral Health   Hallucinations denies / not responding to hallucinations   Thinking intact   Orientation person: oriented;place: oriented;date: oriented;time: oriented   Memory baseline memory   Insight insight appropriate to situation   Judgement impaired   Eye Contact at examiner   Affect full range affect   Mood mood is calm   Physical Appearance/Attire neat   Suicidality other (see comments)  (denies)   Self Injury other (see comment)  (denies)   Elopement (no concerns)   Activity restless   Speech clear;coherent   Psychomotor / Gait balanced;steady   Sleep/Rest/Relaxation   Sleep/Rest/Relaxation no problem identified   Safety   Suicidality Status 15   Coping/Psychosocial    Verbalized Emotional State acceptance;anxiety;hopefulness   Psycho Education   Type of Intervention 1:1 intervention   Response participates with cues/redirection   Hours 0.5   Treatment Detail (feeling hopeful)   Activities of Daily Living   Hygiene/Grooming shower;independent;prompts   Oral Hygiene independent   Dress street clothes   Laundry with supervision   Room Organization independent

## 2018-08-27 PROCEDURE — 25000132 ZZH RX MED GY IP 250 OP 250 PS 637: Performed by: NURSE PRACTITIONER

## 2018-08-27 PROCEDURE — 12400007 ZZH R&B MH INTERMEDIATE UMMC

## 2018-08-27 PROCEDURE — 99232 SBSQ HOSP IP/OBS MODERATE 35: CPT | Performed by: PSYCHIATRY & NEUROLOGY

## 2018-08-27 PROCEDURE — 97127 ZZHC OT THERAPEUTIC INTERVENTION W/FOCUS ON COGNITIVE FUNCTION,EA 15 MIN: CPT | Mod: GO

## 2018-08-27 PROCEDURE — 25000132 ZZH RX MED GY IP 250 OP 250 PS 637: Performed by: PSYCHIATRY & NEUROLOGY

## 2018-08-27 RX ORDER — BUSPIRONE HYDROCHLORIDE 10 MG/1
10 TABLET ORAL 3 TIMES DAILY
Status: DISCONTINUED | OUTPATIENT
Start: 2018-08-27 | End: 2018-08-29 | Stop reason: HOSPADM

## 2018-08-27 RX ADMIN — BUSPIRONE HYDROCHLORIDE 10 MG: 10 TABLET ORAL at 17:11

## 2018-08-27 RX ADMIN — BUSPIRONE HYDROCHLORIDE 10 MG: 10 TABLET ORAL at 20:43

## 2018-08-27 RX ADMIN — ESCITALOPRAM OXALATE 10 MG: 10 TABLET ORAL at 08:31

## 2018-08-27 RX ADMIN — ALBUTEROL SULFATE 2 PUFF: 90 AEROSOL, METERED RESPIRATORY (INHALATION) at 20:43

## 2018-08-27 RX ADMIN — TRAZODONE HYDROCHLORIDE 50 MG: 50 TABLET ORAL at 20:43

## 2018-08-27 RX ADMIN — HYDROXYZINE HYDROCHLORIDE 50 MG: 25 TABLET ORAL at 12:01

## 2018-08-27 RX ADMIN — HYDROCORTISONE: 25 CREAM TOPICAL at 08:31

## 2018-08-27 ASSESSMENT — ACTIVITIES OF DAILY LIVING (ADL)
LAUNDRY: WITH SUPERVISION
HYGIENE/GROOMING: INDEPENDENT
DRESS: INDEPENDENT
ORAL_HYGIENE: INDEPENDENT
ORAL_HYGIENE: INDEPENDENT
HYGIENE/GROOMING: INDEPENDENT
DRESS: STREET CLOTHES

## 2018-08-27 NOTE — PROGRESS NOTES
08/27/18 1357   Behavioral Health   Hallucinations denies / not responding to hallucinations   Thinking intact   Orientation person: oriented;place: oriented;date: oriented;time: oriented   Memory baseline memory   Insight admits / accepts   Judgement intact   Eye Contact at examiner   Affect full range affect   Mood mood is calm   Physical Appearance/Attire appears stated age;attire appropriate to age and situation   Hygiene well groomed   Suicidality other (see comments)  (denies)   1. Wish to be Dead No   2. Non-Specific Active Suicidal Thoughts  No   Activity other (see comment)  (active in milieu)   Speech clear;coherent   Medication Sensitivity no stated side effects   Psychomotor / Gait balanced;steady   Psycho Education   Type of Intervention 1:1 intervention   Response participates, initiates socially appropriate   Hours 0.5   Treatment Detail check in   Activities of Daily Living   Hygiene/Grooming independent   Oral Hygiene independent   Dress street clothes   Room Organization independent   Activity   Activity Assistance Provided independent   Pt calm, out in milieu, social with peers and appropriate.  Denies SI.  At this point waiting for insurance approval to get into CD program ([facility]).

## 2018-08-27 NOTE — PLAN OF CARE
"Problem: Occupational Therapy Goals (Adult)  Goal: Occupational Therapy Goals  -Pt will demonstrate an increase in attention as evidenced by remaining present in group room for full duration of group while engaged in a task or group discussion within 1 week. (Goal met)  -Pt will independently identify 3 coping skills to utilize as needed upon discharge within 1 week. (Goal met)  -Pt will independently utilize his identified coping skills x3 as triggers present while present in OT groups within 1 week.          Pt attended 3 out of 3 OT groups offered. Pt actively participated in occupational therapy clinic. Pt was able to ask for assistance as needed, and independently return to a familiar, goal-directed task without diffiuclty. Pt demonstrated good focus, planning, and problem solving for full duration of clinic. Pt appeared comfortable interacting with peers and writer, however was observed to become upset on one occasion when a peer was talking about alcohol. After writer redirected peer to refrain from this type of discussion, Pt was able to identify that he was feeling \"triggered\" and that he needed to concentrate on his goal-directed task in order to cope. Pt was able to concentrate on his task and remain in group for full duration. Pt actively participated in a mental health management group with a focus on coping through movement to facilitate development of coping skills, relaxation, deep breathing techniques, and stress management via chair yoga. Pt followed and engaged in the yoga poses, and verbalized feeling calmer at the end of group. Pt remains pleasant and engaged with a congruent affect during OT groups, and continues to work towards achieving his OT goals to develop and effectively utilize coping skills.         "

## 2018-08-27 NOTE — PROGRESS NOTES
Writer spoke with Nina SIMMS at [facility] ([phone]) she stated Pt has been approved for [facility] to start tomorrow.     Writer REYNOLD with [facility]  at [facility] ([phone]) asked for a call back to schedule Pt admission for tomorrow.

## 2018-08-28 PROCEDURE — 25000132 ZZH RX MED GY IP 250 OP 250 PS 637: Performed by: NURSE PRACTITIONER

## 2018-08-28 PROCEDURE — 25000132 ZZH RX MED GY IP 250 OP 250 PS 637: Performed by: PSYCHIATRY & NEUROLOGY

## 2018-08-28 PROCEDURE — 12400007 ZZH R&B MH INTERMEDIATE UMMC

## 2018-08-28 PROCEDURE — 99232 SBSQ HOSP IP/OBS MODERATE 35: CPT | Performed by: PSYCHIATRY & NEUROLOGY

## 2018-08-28 PROCEDURE — G0177 OPPS/PHP; TRAIN & EDUC SERV: HCPCS

## 2018-08-28 PROCEDURE — 97127 ZZHC OT THERAPEUTIC INTERVENTION W/FOCUS ON COGNITIVE FUNCTION,EA 15 MIN: CPT | Mod: GO

## 2018-08-28 RX ORDER — BUSPIRONE HYDROCHLORIDE 10 MG/1
10 TABLET ORAL 3 TIMES DAILY
Qty: 90 TABLET | Refills: 0 | Status: SHIPPED | OUTPATIENT
Start: 2018-08-28 | End: 2018-09-29 | Stop reason: ALTCHOICE

## 2018-08-28 RX ADMIN — BUSPIRONE HYDROCHLORIDE 10 MG: 10 TABLET ORAL at 08:30

## 2018-08-28 RX ADMIN — TRAZODONE HYDROCHLORIDE 50 MG: 50 TABLET ORAL at 20:21

## 2018-08-28 RX ADMIN — BUSPIRONE HYDROCHLORIDE 10 MG: 10 TABLET ORAL at 15:28

## 2018-08-28 RX ADMIN — ESCITALOPRAM OXALATE 10 MG: 10 TABLET ORAL at 08:30

## 2018-08-28 RX ADMIN — BUSPIRONE HYDROCHLORIDE 10 MG: 10 TABLET ORAL at 20:21

## 2018-08-28 ASSESSMENT — ACTIVITIES OF DAILY LIVING (ADL)
LAUNDRY: WITH SUPERVISION
LAUNDRY: WITH SUPERVISION
DRESS: STREET CLOTHES
DRESS: INDEPENDENT
GROOMING: INDEPENDENT
GROOMING: INDEPENDENT
ORAL_HYGIENE: INDEPENDENT
ORAL_HYGIENE: INDEPENDENT

## 2018-08-28 NOTE — PROGRESS NOTES
Writer received VM from Drea at [facility] ([phone]) who reported they have not heard from [facility], therefore, we cannot schedule admission for today. They will give Writer a call if this changes.    Writer REYNOLD for Drea at [facility] reported yesterday, Writer talked with Nina at [facility] who was adamant that authorization letter was faxed to [facility] in the afternoon. Writer provided Drea Wood's contact information for clarification of coverage.     Writer received VM from Pushpa at [facility] ([phone]) who reported authorization has not been received from [facility].     Writer spoke with Shefali at [facility] ([phone]) because Nina is at lunch. Asked Shefali if there is a authorization number to reference. Shefali was not able to see a reference number, but will give message to Nina once she is back from lunch.

## 2018-08-28 NOTE — PROGRESS NOTES
[facility], [place]   Psychiatric Progress Note      Impression:   This is a 23 year old male admitted for SI, out of control behaviors, aggression and s/p suicide attempt. After he was brought to ER, he became violent, and wrapped bedding around his neck.  We are adjusting medications to target mood.  We are also working with the patient on therapeutic skill building.             Diagnoses and Plan:   Principal Diagnosis: Polysubstance use,   Unspecified Depressive disorder, rule out substance-induced mood disorder  Unit: Station 10  Attending: Memo  Medications: risks/benefits discussed with patient  - Lexapro 10mg daily  -buspar 10mg TID for anxiety.   Laboratory/Imaging:  - UDS neg, COMP wnl, CBC wnl, TSH wnl and lipids wnl   - HepC Ab neg, HIV Antigen antibody combo neg, Treponema ab neg, HepB surface Ab neg  -ROBBIE 0.235  Consults  - CD assessment ( finished on 8/20)  - Internal medicine - to assess the rash on right elbow -  Patient will be treated in therapeutic milieu with appropriate individual and group therapies as described.  Secondary psychiatric diagnoses of concern this admission:  Rule out Cluster B personality      Medical diagnoses to be addressed this admission:   Asthma       Legal Status: Voluntary      Safety Assessment:   Checks: Status 15  Precautions: Suicide  Pt has not required locked seclusion or restraints in the past 24 hours to maintain safety, please refer to RN documentation for further details.    The risks, benefits, alternatives and side effects have been discussed and are understood by the patient and other caregivers.   Target symptoms to stabilize: SI, aggression and disordered eating  Target disposition: We are currently awaiting the placement at [facility].          Attestation:  Patient has been seen and evaluated by me,  Wilmer Hampton MD          Interim History:   The patient's care was discussed with the treatment team and chart notes were  "reviewed.    Pt says that he does not feel any less anxious so far, with Buspar that was started yesterday. He continues to feel anxiety.   He knows that more likely he will be discharged to [facility], and he feels ready.   He continues to be cooperative on the unit. Sleep and appetite are reported to be good.  The 10 point Review of Systems is negative other than noted in the HPI         Medications:       busPIRone  10 mg Oral TID     escitalopram  10 mg Oral Daily     haloperidol lactate  5 mg Intramuscular Once     hydrocortisone   Topical BID             Allergies:     Allergies   Allergen Reactions     Animal Dander      Other [Seasonal Allergies]             Psychiatric Examination:   BP (!) 136/91 (BP Location: Left arm)  Pulse 94  Temp 96.5  F (35.8  C) (Oral)  Resp 16  Ht 1.727 m (5' 8\")  Wt 59.6 kg (131 lb 6.4 oz)  SpO2 97%  BMI 19.98 kg/m2  Weight is 131 lbs 6.4 oz  Body mass index is 19.98 kg/(m^2).           Labs:   No results found for this or any previous visit (from the past 24 hour(s)).  "

## 2018-08-28 NOTE — PROGRESS NOTES
"Pt reports he is discharging to [facility] tomorrow, pt appears hopeful for the future. Pt was visible in milieu and social w/peers throughout evening shift. Pt denies symptoms of depression and anxiety. Pt denies psychotic symptoms. Pt denies SI and SIB. Pt reports normal appetite. Pt stated that his sleep has \"decreased from 12 hours a night to 8.\" Pt reports that he continues to struggle with his concentration, there has been no changes to it since he has arrived. Pt's stepdad visited this evening, pt reports that the visit went well.      08/27/18 2100   Behavioral Health   Hallucinations denies / not responding to hallucinations   Thinking poor concentration   Orientation person: oriented;place: oriented;date: oriented;time: oriented   Memory baseline memory   Insight admits / accepts   Judgement intact   Eye Contact at examiner   Affect blunted, flat   Mood mood is calm   Physical Appearance/Attire attire appropriate to age and situation   Hygiene well groomed   Suicidality (Denies SI)   1. Wish to be Dead No   2. Non-Specific Active Suicidal Thoughts  No   Self Injury (Denies SIB)   Elopement (No elopment concerns)   Activity (visible in milieu, social w/peers)   Speech clear;coherent   Medication Sensitivity no observed side effects;no stated side effects   Psychomotor / Gait balanced;steady   Activities of Daily Living   Hygiene/Grooming independent   Oral Hygiene independent   Dress independent   Laundry with supervision   Room Organization independent     "

## 2018-08-28 NOTE — PLAN OF CARE
"Problem: Mood Impairment (Depressive Signs/Symptoms) (Adult)  Goal: Improved Mood Symptoms (Depressive Signs/Symptoms)  Outcome: Improving  \"I'm doing OK.  It doesn't really matter when I'm leaving, I'm not going anywhere until I go to treatment.\"  Comment regarding uncertainty of discharge date to [facility] as funding has not yet been approved.  Indicates is not feeling depressed or anxious.  Denies suicidal ideation, wish to be dead or self harm thoughts.  Continues to experience some difficulty with concentration as this is a chronic problem.  Is experiencing some racing and ruminating thoughts, but this along with concentration has improved \"just a little\".  Denies side effects from medication \"except that I felt strange after taking the Buspar this morning\".  Was reassured that feeling would dissipate as body adjusted to new medication.  States continues to be sleeping much better and feels rested.  Has been visible in milieu, attending all programming, is social with peers and spends free time in lounge watching TV with peers.      "

## 2018-08-28 NOTE — DISCHARGE SUMMARY
Psychiatric Discharge Summary    Abdulaziz Mart MRN# [id number]   Age: 23 year old YOB: 1995     Date of Admission:  8/15/2018  Date of Discharge:  8/29/2018  Admitting Physician:  Wilmer Hampton MD  Discharge Physician:  Wilmer Hampton MD         Event Leading to Hospitalization:   Patient was admitted from ER for SI, out of control behaviors, aggression and alcohol use.  .      After the last discharge in 9/2017, he did not follow up on treatment, has not taken medication for 4 months now.  He continued drinking alcohol every day, and for the last 2 months, he has been snorting cocaine every day.   He called his sister , intoxicated, and told her that he was having SI. In ER, he said he might have told someone that he was suicidal. Then he began acting violently. He started taking mechanics of bed apart, so they had to remove the bed with a chair. He used profanities toward staff in ER.   He tried to take apart cell phone. He had laceration on left arm, but denied it was a suicide attempt.   He tied bedding around the neck.      After a while, he became calmer and cooperative.      On my examination, he admits that he has a problem with substance use and he wants to go to treatment.   This has been a pattern for him, that he gets intoxicated then becomes suicidal.        See Admission note for additional details.          Diagnoses/Labs/Consults/Hospital Course:   Principal Diagnosis: Polysubstance use,   Unspecified Depressive disorder, rule out substance-induced mood disorder  Unit: [facility] 10  Attending: Memo  Medications: risks/benefits discussed with patient  In this hospitalization, we :  - Started Lexapro 10mg daily  -Started buspar 10mg TID for anxiety.   -Started hydrocortisone cream 2.5 % Apply to the eczema on right decubitus area.  -prn albuterol inhaler 2 puffs Q 4 hours for shortness of breath.  Laboratory/Imaging:  - UDS neg, COMP wnl, CBC wnl, TSH wnl and lipids wnl   - HepC  Ab neg, HIV Antigen antibody combo neg, Treponema ab neg, HepB surface Ab neg  -ROBBIE 0.235  Consults  - CD assessment ( finished on 8/20)  - Internal medicine - to assess the rash on right elbow -hydrocortisone cream was prescribed.    Secondary psychiatric diagnoses of concern this admission:  Rule out Cluster B personality      Medical diagnoses to be addressed this admission:   Asthma       Legal Status: Voluntary      Safety Assessment:   Checks: Status 15  Precautions: Suicide  Pt has not required locked seclusion or restraints in the past 24 hours to maintain safety, please refer to RN documentation for further details.    The risks, benefits, alternatives and side effects were discussed and are understood by the patient and other caregivers.    For the initial few days of his stay on the unit, he seemed to be struggling with his urge to use substance and urge to be discharged from the hospital. His depressed mood and SI improved quickly, once intoxicated status went away.   It seemed that he has this reaction of getting depressed and suicidal, when intoxicated.   After he became sober, he continued to have restless feeling and anxiety. Hydroxyzine tablet was tried for anxiety first, but it did not improve the symptoms significantly.   Then the patient requested that hydroxyzine changed to another agent, so it was changed to buspar 10mg TID . He tolerated buspar well without issues, but at the time of discharge he has not been on buspar long enough for us to decide whether it helps his anxiety or not. He was willing to keep taking it, as well as Lexapro.   The latter half of his stay was a waiting period for him to get in the treatment at [facility].   He patiently waited for this, every day, while participating in groups.   At the time of discharge, he was looking forward to the treatment and hopeful about the future.   Abdulaziz TIMOTEO Mart did participate in groups and was visible in the milieu.  The patient's  symptoms of SI, depressed and disorganization improved.   He  was able to name several adaptive coping skills and supportive people in his life.      Abdulaziz Mart was released to [facility]. At the time of discharge, Abdulaziz Mart was determined to be at his baseline level of danger to himself and others (elevated to some degree given past behaviors).    Care was coordinated with [facility] ..    Discussed plan with patient on day of discharge.         Discharge Medications:     Current Discharge Medication List      START taking these medications    Details   busPIRone (BUSPAR) 10 MG tablet Take 1 tablet (10 mg) by mouth 3 times daily  Qty: 90 tablet, Refills: 0    Associated Diagnoses: Adjustment disorder with anxious mood      hydrocortisone 2.5 % cream Apply topically 2 times daily  Qty: 20 g, Refills: 0    Associated Diagnoses: Other eczema         CONTINUE these medications which have CHANGED    Details   albuterol (PROAIR HFA/PROVENTIL HFA/VENTOLIN HFA) 108 (90 Base) MCG/ACT inhaler Inhale 2 puffs into the lungs every 4 hours as needed for wheezing or shortness of breath / dyspnea  Qty: 1 Inhaler, Refills: 0    Associated Diagnoses: Mild persistent asthma without complication      escitalopram (LEXAPRO) 10 MG tablet Take 1 tablet (10 mg) by mouth daily  Qty: 30 tablet, Refills: 0    Associated Diagnoses: Other depression         STOP taking these medications       hydrOXYzine (ATARAX) 25 MG tablet Comments:   Reason for Stopping:         TRAZODONE HCL PO Comments:   Reason for Stopping:                    Psychiatric Examination:   Appearance:  awake, alert, adequately groomed, appeared as age stated, cooperative and no apparent distress  Attitude:  cooperative  Eye Contact:  fair  Mood:  anxious  Affect:  mood congruent  Speech:  clear, coherent  Psychomotor Behavior:  no evidence of tardive dyskinesia, dystonia, or tics and intact station, gait and muscle tone  Thought Process:   linear  Associations:  no loose associations  Thought Content:  no evidence of suicidal ideation or homicidal ideation, no evidence of psychotic thought, no auditory hallucinations present and no visual hallucinations present  Insight:  fair  Judgment:  fair  Oriented to:  time, person, and place  Attention Span and Concentration:  fair  Recent and Remote Memory:  fair  Language: Able to name objects  Fund of Knowledge: appropriate  Muscle Strength and Tone: normal  Gait and Station: Normal         Discharge Plan:   Health Care Follow-up Appointments:         If no appointments scheduled, explain please participate fully in CD treatment at [facility], and work with your treatment team there on a safe discharge plan. See below for mental health resources in your area:   1) [facility]: [address] [place] [address] Phone: [phone]  2) [facility] ([facility]): [address], [address] [facility] [address] Phone: [phone]  3) [facility]: [address] [place]. [address] Phone: [phone]    Attestation:  The patient has been seen and evaluated by me,  Wilmer Hampton MD  Time: < 30 minutes

## 2018-08-28 NOTE — PLAN OF CARE
"Problem: Occupational Therapy Goals (Adult)  Goal: Occupational Therapy Goals  -Pt will demonstrate an increase in attention as evidenced by remaining present in group room for full duration of group while engaged in a task or group discussion within 1 week. (Goal met)  -Pt will independently identify 3 coping skills to utilize as needed upon discharge within 1 week. (Goal met)  -Pt will independently utilize his identified coping skills x3 as triggers present while present in OT groups within 1 week.           Pt attended 3 out of 3 OT groups offered. Pt actively participated in occupational therapy clinic. Pt was able to ask for assistance as needed, and independently return to a familiar, goal-directed task without difficulty. Pt demonstrated good focus, planning, attention to detail, and problem solving for full duration of clinic. Pt appeared comfortable interacting with peers and writer and initiated conversation throughout group. Pt attended a structured OT group with a focus on self-awareness and socialization through a hands-on task to facilitate discussion. Pt appeared comfortable sharing positive personal information, and was respectful in listening and responding to peers. Pt identified \"I'm a really great friend\" as a positive personal strength. Pt actively participated in a structured occupational therapy group with a focus on completion of a hands-on task to develop personal positive affirmations. Pt completed about 80% of positive affirmation task independently, which involved filling in prompts with positive personal qualities. Appeared comfortable sharing with peers, and was respectful in listening to peers throughout. Demonstrated/verbalized benefit of self-identification of positive personal characteristics. Pt was pleasant and engaged with a congruent affect during OT groups this date. Writer will continue to support and encourage Pt's use of coping skills as triggers present themselves during " OT groups.

## 2018-08-28 NOTE — PLAN OF CARE
Problem: Patient Care Overview  Goal: Individualization & Mutuality  Illness Management Recovery model:  Wellness Strategies.    Patient identified the following actions/activities they can do to stay well.    1. Will stay derrek  2. Will take medications  3. Will stay motivated by keeping day structured.

## 2018-08-29 VITALS
OXYGEN SATURATION: 97 % | TEMPERATURE: 96.6 F | DIASTOLIC BLOOD PRESSURE: 91 MMHG | SYSTOLIC BLOOD PRESSURE: 136 MMHG | BODY MASS INDEX: 19.91 KG/M2 | HEART RATE: 94 BPM | WEIGHT: 131.4 LBS | HEIGHT: 68 IN | RESPIRATION RATE: 16 BRPM

## 2018-08-29 PROCEDURE — 25000132 ZZH RX MED GY IP 250 OP 250 PS 637: Performed by: PSYCHIATRY & NEUROLOGY

## 2018-08-29 PROCEDURE — 25000132 ZZH RX MED GY IP 250 OP 250 PS 637: Performed by: NURSE PRACTITIONER

## 2018-08-29 RX ADMIN — BUSPIRONE HYDROCHLORIDE 10 MG: 10 TABLET ORAL at 08:42

## 2018-08-29 RX ADMIN — ESCITALOPRAM OXALATE 10 MG: 10 TABLET ORAL at 08:42

## 2018-08-29 NOTE — PROGRESS NOTES
Pt will discharge to [facility] today, per Pt his sister will pick him up today at 11:30 am for discharge. Writer verified with Yvonne at [facility] ([phone]) that Pt has intake for 1:00 pm today. AVS completed.

## 2018-08-29 NOTE — PLAN OF CARE
"Problem: Mood Impairment (Depressive Signs/Symptoms) (Adult)  Goal: Improved Mood Symptoms (Depressive Signs/Symptoms)  Outcome: Completed Date Met: 08/29/18  \"I'm feeling good, I'm looking forward to treatment.\"  Indicates is not feeling depressed, has \"a little anxiety\" due to discharge to CD treatment today. Denies suicidal ideation, self harm thoughts or wish to be dead.  Continues to have difficulty with focus and concentration, racing and ruminating thoughts, but states these are \"a little better.\"  Is sleeping well at night, denies medication side effects.  States will participate in AA and NA while in CD treatment and may obtain a sponsor.  \"It's not going to hurt, everything will be helpful.\"  Is hoping to be able to go long term inpatient CD treatment and would like to work with a therapist after that.  Has been visible in milieu, attending groups and social with peers.  Was discharged at 1140 accompanied by sister with belongings, instructions and medications.      "

## 2018-08-29 NOTE — DISCHARGE INSTRUCTIONS
Behavioral Discharge Planning and Instructions      Summary:  You were admitted on 8/15/2018  due to Suicidal Ideations, Self Injurious Behaviors and Chemical Use Issues.  You were treated by Dr. Wilmer Hampton MD and discharged on 8/29/18 from [facility] to [facility].     Principal Diagnosis: Polysubstance use     Health Care Follow-up Appointments:   [facility]   [address] [place] [address]  Phone: [phone]  Fax: [phone] HUC PLEASE FAX DISCHARGE PAPERWORK     If no appointments scheduled, explain please participate fully in CD treatment at [facility], and work with your treatment team there on a safe discharge plan. See below for mental health resources in your area:   1) [facility]: [address] [place] [address] Phone: [phone]  2) [facility] ([facility]): [address], [address] [facility] [address] Phone: [phone]  3) [facility]: [address] [place]. [address] Phone: [phone]  Attend all scheduled appointments with your outpatient providers. Call at least 24 hours in advance if you need to reschedule an appointment to ensure continued access to your outpatient providers.   Major Treatments, Procedures and Findings:  You were provided with: a psychiatric assessment, assessed for medical stability, medication evaluation and/or management, group therapy and milieu management    Symptoms to Report: feeling more aggressive, increased confusion, losing more sleep, mood getting worse or thoughts of suicide    Early warning signs can include: increased depression or anxiety sleep disturbances increased thoughts or behaviors of suicide or self-harm  increased unusual thinking, such as paranoia or hearing voices    Safety and Wellness:  Take all medicines as directed.  Make no changes unless your  doctor suggests them.      Follow treatment recommendations.  Refrain from alcohol and non-prescribed drugs.  Ask your support system to help you reduce your access to items that could harm yourself or others. If there is a concern for safety, call 911.    Resources:   Crisis Intervention: 370.622.1721 or 649-717-3985 (TTY: 828.391.6539).  Call anytime for help.  National [employer] on Mental Illness (www.[place].dotty.org): 786.563.5144 or 551-282-2582.  Alcoholics Anonymous (www.alcoholics-anonymous.org): Check your phone book for your local chapter.  Suicide Awareness Voices of Education (SAVE) (www.save.org): 033-356-LHWT (0013)  National Suicide Prevention Line (www.mentalhealthmn.org): 322-408-GQWP (0332)  Mental Health Consumer/Survivor Network of [place] (www.mhcsn.net): 372.891.2347 or 551-984-2826  Mental Health Association of [place] (www.mentalhealth.org): 359.736.9490 or 026-405-7494  Self- Management and Recovery Training., SMART-- Toll free: 276.471.4302  www.[employer].org  [facility] Crisis Response [phone]    The treatment team has appreciated the opportunity to work with you.     Please take care and make your recovery a daily recovery.   If you have any questions or concerns our unit number is [phone].  Thank you.

## 2018-08-29 NOTE — PLAN OF CARE
Problem: Patient Care Overview  Goal: Individualization & Mutuality  Illness Management Recovery model:  Warning Signs.    Patient identified the following early warning signs which may indicate that a relapse of their illness is startin. Being lazy  2. Sleeping a lot.  3. Letting myself get bored.

## 2018-08-29 NOTE — PROGRESS NOTES
08/28/18 2020   Behavioral Health   Hallucinations denies / not responding to hallucinations   Thinking intact   Orientation time: oriented;place: oriented;date: oriented;person: oriented   Memory baseline memory   Insight insight appropriate to situation   Judgement impaired   Eye Contact at examiner   Affect blunted, flat   Mood mood is calm   Physical Appearance/Attire attire appropriate to age and situation   Hygiene neglected grooming - unclean body, hair, teeth   Suicidality (denied )   1. Wish to be Dead No   2. Non-Specific Active Suicidal Thoughts  No   Self Injury (denied )   Elopement (denied )   Activity (attending groups and socializing with peers )   Speech coherent;clear   Medication Sensitivity no stated side effects   Psychomotor / Gait balanced   Psycho Education   Type of Intervention 1:1 intervention   Response participates, initiates socially appropriate   Hours 0.5   Activities of Daily Living   Hygiene/Grooming independent   Oral Hygiene independent   Dress street clothes   Laundry with supervision   Room Organization independent   Activity   Activity Assistance Provided independent     Pt was calm and cooperative with blunted mood affect. He was visibile in milieu for the majority part of the shift watching tv and socializing with peers. He denied all metal health symptoms including SI/SIB. He reported that he is ready and excited to go to treatment center tomorrow. He is eating and sleeping well.

## 2018-08-30 NOTE — PROGRESS NOTES
8/30/18:  Received update from Desean Boudreaux at [facility] ([phone]) who reported Pt arrived yesterday at 1 pm for intake and discharged himself from [facility] at 6:00pm.

## 2018-09-29 ENCOUNTER — HOSPITAL ENCOUNTER (EMERGENCY)
Facility: CLINIC | Age: 23
Discharge: HOME | End: 2018-09-29
Attending: NURSE PRACTITIONER | Admitting: NURSE PRACTITIONER
Payer: COMMERCIAL

## 2018-09-29 ENCOUNTER — APPOINTMENT (OUTPATIENT)
Dept: GENERAL RADIOLOGY | Facility: CLINIC | Age: 23
End: 2018-09-29
Attending: NURSE PRACTITIONER
Payer: COMMERCIAL

## 2018-09-29 VITALS
RESPIRATION RATE: 18 BRPM | SYSTOLIC BLOOD PRESSURE: 127 MMHG | OXYGEN SATURATION: 96 % | DIASTOLIC BLOOD PRESSURE: 68 MMHG | HEART RATE: 76 BPM | TEMPERATURE: 98.8 F

## 2018-09-29 DIAGNOSIS — S99.922A FOOT INJURY, LEFT, INITIAL ENCOUNTER: Primary | ICD-10-CM

## 2018-09-29 PROCEDURE — 73630 X-RAY EXAM OF FOOT: CPT | Mod: LT

## 2018-09-29 PROCEDURE — 99213 OFFICE O/P EST LOW 20 MIN: CPT | Mod: Z6 | Performed by: NURSE PRACTITIONER

## 2018-09-29 PROCEDURE — G0463 HOSPITAL OUTPT CLINIC VISIT: HCPCS | Performed by: NURSE PRACTITIONER

## 2018-09-29 ASSESSMENT — ENCOUNTER SYMPTOMS
CONSTIPATION: 0
SORE THROAT: 0
NAUSEA: 0
DIFFICULTY URINATING: 0
VOMITING: 0
DIAPHORESIS: 0
FEVER: 0
SHORTNESS OF BREATH: 0
EYE PAIN: 0
WHEEZING: 0
SINUS PAIN: 0
COUGH: 0
ARTHRALGIAS: 1
DYSURIA: 0
ABDOMINAL PAIN: 0
DIARRHEA: 0
HEADACHES: 0
CHILLS: 0

## 2018-09-29 NOTE — ED AVS SNAPSHOT
[facility]    [address] [address]    Phone:  [phone]    Fax:  [phone]                                       Abdulaziz Mart   MRN: [id number]    Department:  [facility]   Date of Visit:  9/29/2018           Patient Information     Date Of Birth          1995        Your diagnoses for this visit were:     Foot injury, left, initial encounter        You were seen by Yumiko Mccarty APRN CNP.      Follow-up Information     Follow up with No Ref-Primary, Physician.    Why:  As needed, If symptoms worsen        Follow up with podiatry.    Why:  As needed, If symptoms worsen        Discharge Instructions       You may take ibuprofen 600 mg with 1000 mg of tylenol every 8 hours as needed for pain management.  Wear the post op shoe at all times when ambulatory for one week then you may have the post op shoe off when in your room.  After two weeks then you can decrease usage of post op shoe for long walks or extended walks and as needed for comfort.  Follow up with podiatry if ongoing pain or worsening of symptoms.    Discharge References/Attachments     TOE SPRAIN (ENGLISH)      24 Hour Appointment Hotline       To make an appointment at any [facility] clinic, call [phone] ([phone]). If you don't have a family doctor or clinic, we will help you find one. [facility] clinics are conveniently located to serve the needs of you and your family.          ED Discharge Orders     Post-Op Shoe                    Review of your medicines      Our records show that you are taking the medicines listed below. If these are incorrect, please call your family doctor or clinic.        Dose / Directions Last dose taken    albuterol 108 (90 Base) MCG/ACT inhaler   Commonly known as:  PROAIR HFA/PROVENTIL HFA/VENTOLIN HFA   Dose:  2 puff   Quantity:  1 Inhaler        Inhale 2 puffs into the lungs every 4 hours as needed for wheezing or  "shortness of breath / dyspnea   Refills:  0        hydrocortisone 2.5 % cream   Quantity:  20 g        Apply topically 2 times daily   Refills:  0        WELLBUTRIN XL PO   Dose:  300 mg        Take 300 mg by mouth daily   Refills:  0                Procedures and tests performed during your visit     Foot XR, G/E 3 views, left      Orders Needing Specimen Collection     None      Pending Results     No orders found from 9/27/2018 to 9/30/2018.            Pending Culture Results     No orders found from 9/27/2018 to 9/30/2018.            Pending Results Instructions     If you had any lab results that were not finalized at the time of your Discharge, you can call the ED Lab Result RN at [phone]. You will be contacted by this team for any positive Lab results or changes in treatment. The nurses are available 7 days a week from 10A to 6:30P.  You can leave a message 24 hours per day and they will return your call.        Test Results From Your Hospital Stay        9/29/2018  2:55 PM      Narrative     LEFT FOOT THREE OR MORE VIEWS   9/29/2018 2:45 PM     HISTORY:  Bent foot two weeks ago and still having pain.     COMPARISON: None.        Impression     IMPRESSION: No evidence of acute fracture or subluxation. Negative.    JOVANNI RIVAS MD                Thank you for choosing [facility]       Thank you for choosing [facility] for your care. Our goal is always to provide you with excellent care. Hearing back from our patients is one way we can continue to improve our services. Please take a few minutes to complete the written survey that you may receive in the mail after you visit with us. Thank you!        [employer] Information     [employer] lets you send messages to your doctor, view your test results, renew your prescriptions, schedule appointments and more. To sign up, go to [email or web address] . Click on \"Log in\" on the left side of the screen, which will take you to the Welcome page. Then click on \"Sign up Now\" " on the right side of the page.     You will be asked to enter the access code listed below, as well as some personal information. Please follow the directions to create your username and password.     Your access code is: [email or web address]  Expires: 2018 10:27 AM     Your access code will  in 90 days. If you need help or a new code, please call your [facility] clinic or [phone].        Care EveryWhere ID     This is your Care EveryWhere ID. This could be used by other organizations to access your [facility] medical records  [id number]        Equal Access to Services     [facility] : Edenilson Turcios, arnie salazar, elin will, jose rider . So [facility] [phone].    ATENCIÓN: Si habla español, tiene a ahn disposición servicios gratuitos de asistencia lingüística. Llame al [phone].    We comply with applicable federal civil rights laws and [place] laws. We do not discriminate on the basis of race, color, national origin, age, disability, sex, sexual orientation, or gender identity.            After Visit Summary       This is your record. Keep this with you and show to your community pharmacist(s) and doctor(s) at your next visit.

## 2018-09-29 NOTE — ED AVS SNAPSHOT
[facility]    [address] [address]    Phone:  [phone]    Fax:  [phone]                                       Abdulaziz Mart   MRN: [id number]    Department:  [facility]   Date of Visit:  9/29/2018           After Visit Summary Signature Page     I have received my discharge instructions, and my questions have been answered. I have discussed any challenges I see with this plan with the nurse or doctor.    ..........................................................................................................................................  Patient/Patient Representative Signature      ..........................................................................................................................................  Patient Representative Print Name and Relationship to Patient    ..................................................               ................................................  Date                                   Time    ..........................................................................................................................................  Reviewed by Signature/Title    ...................................................              ..............................................  Date                                               Time          22EPIC Rev 08/18

## 2018-09-29 NOTE — ED TRIAGE NOTES
Patient presents today with left foot pain.  Patients foot got caught between a seat and got twisted . Symptoms started two weeks ago . Arrived to urgent care ambulatory .

## 2018-09-29 NOTE — DISCHARGE INSTRUCTIONS
You may take ibuprofen 600 mg with 1000 mg of tylenol every 8 hours as needed for pain management.  Wear the post op shoe at all times when ambulatory for one week then you may have the post op shoe off when in your room.  After two weeks then you can decrease usage of post op shoe for long walks or extended walks and as needed for comfort.  Follow up with podiatry if ongoing pain or worsening of symptoms.
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
## 2019-08-07 ENCOUNTER — HOSPITAL ENCOUNTER (EMERGENCY)
Facility: CLINIC | Age: 24
Discharge: HOME | End: 2019-08-07
Attending: EMERGENCY MEDICINE | Admitting: EMERGENCY MEDICINE
Payer: COMMERCIAL

## 2019-08-07 VITALS
TEMPERATURE: 98.7 F | HEART RATE: 67 BPM | SYSTOLIC BLOOD PRESSURE: 128 MMHG | OXYGEN SATURATION: 98 % | HEIGHT: 68 IN | WEIGHT: 130 LBS | DIASTOLIC BLOOD PRESSURE: 88 MMHG | RESPIRATION RATE: 18 BRPM | BODY MASS INDEX: 19.7 KG/M2

## 2019-08-07 DIAGNOSIS — J45.21 MILD INTERMITTENT ASTHMA WITH EXACERBATION: ICD-10-CM

## 2019-08-07 PROCEDURE — 94640 AIRWAY INHALATION TREATMENT: CPT

## 2019-08-07 PROCEDURE — 99283 EMERGENCY DEPT VISIT LOW MDM: CPT | Mod: 25

## 2019-08-07 PROCEDURE — 25000125 ZZHC RX 250: Performed by: EMERGENCY MEDICINE

## 2019-08-07 RX ORDER — IPRATROPIUM BROMIDE AND ALBUTEROL SULFATE 2.5; .5 MG/3ML; MG/3ML
3 SOLUTION RESPIRATORY (INHALATION) ONCE
Status: COMPLETED | OUTPATIENT
Start: 2019-08-07 | End: 2019-08-07

## 2019-08-07 RX ORDER — ALBUTEROL SULFATE 90 UG/1
2 AEROSOL, METERED RESPIRATORY (INHALATION) EVERY 4 HOURS PRN
Qty: 1 INHALER | Refills: 0 | Status: SHIPPED | OUTPATIENT
Start: 2019-08-07

## 2019-08-07 RX ADMIN — IPRATROPIUM BROMIDE AND ALBUTEROL SULFATE 3 ML: .5; 3 SOLUTION RESPIRATORY (INHALATION) at 05:48

## 2019-08-07 ASSESSMENT — ENCOUNTER SYMPTOMS
SHORTNESS OF BREATH: 1
COUGH: 0
RHINORRHEA: 0

## 2019-08-07 ASSESSMENT — MIFFLIN-ST. JEOR: SCORE: 1549.18

## 2019-08-07 NOTE — DISCHARGE INSTRUCTIONS
Follow-up:  Please follow-up with your primary care provider in 1-2 days for re-evaluation and discussion of your visit to the emergency department today.    Home treatments:  Recommended home therapies include prednisone (daily for 5 days), nebulizer medication 4 times daily as needed.  Continue with your regular home medications    New prescriptions:  Albuterol inhaler    Return precautions:  Warning signs which should prompt you to return to the ER include worsening difficulty breathing, require your inhaler/nebulizer more than every 4 hours, develop feelings of lightheadedness or faitness, or any other new or troubling symptoms.  We are always happy to see you again.      Discharge Instructions  Asthma    Asthma is a condition causing narrowing and inflammation of the airways that can make it hard to breathe.  Asthma can also cause cough, wheezing, noisy breathing and tightness in the chest.  Asthma can be brought on or  triggered  by many things, including dust, mold, pollen, cigarette smoke, exercise, stress and infections, like the common cold.     Return to the Emergency Department if:  Your breathing gets worse.  You need to use your inhaler more often than every 4 hours, or can t get relief from your inhaler.  You are very weak, or feel much more ill.  You develop new symptoms, such as chest pain.  You cough up blood.  You are vomiting enough that you can t keep fluids or your medicine down.    What can I do to help myself?  Fill any prescriptions the doctor gave you and take them right away--especially antibiotics. Be sure to finish the whole antibiotic prescription.  You may be given a prescription for an inhaler, which can help loosen tight air passages.  Use this as needed, but not more often than directed. Inhalers work much better when used with a spacer.   You may be given a prescription for a steroid to reduce inflammation. Used long-term, these can have many serious side effects, but for short  courses these do not happen. You may notice restlessness or increased appetite.      You may use non-prescription cough or cold medicines. Cough medicines may help, but don t make the cough go away completely.   Avoid smoke, because this can make your symptoms worse. If you smoke, this may be a good time to quit! Consider using nicotine lozenges, gum, or patches to reduce cravings.   If you have a fever, Tylenol  (acetaminophen), Motrin  (ibuprofen), or Advil  (ibuprofen), may help bring fever down and may help you feel more comfortable. Be sure to read and follow the package directions, and ask your doctor if you have questions.  Be sure to get your flu shot each year.  The pneumonia shot can help prevent pneumonia.  It is important that you follow up with your regular doctor, to be sure that you are improving from this spell (an acute asthma exacerbation), and also to do what you can to keep from having trouble again. Sometimes you need long-term medicines to keep your asthma under control.   If you were given a prescription for medicine here today, be sure to read all of the information (including the package insert) that comes with your prescription.  This will include important information about the medicine, its side effects, and any warnings that you need to know about.  The pharmacist who fills the prescription can provide more information and answer questions you may have about the medicine.  If you have questions or concerns that the pharmacist cannot address, please call or return to the Emergency Department.   Opioid Medication Information    Pain medications are among the most commonly prescribed medicines, so we are including this information for all our patients. If you did not receive pain medication or get a prescription for pain medicine, you can ignore it.     You may have been given a prescription for an opioid (narcotic) pain medicine and/or have received a pain medicine while here in the  Emergency Department. These medicines can make you drowsy or impaired. You must not drive, operate dangerous equipment, or engage in any other dangerous activities while taking these medications. If you drive while taking these medications, you could be arrested for DUI, or driving under the influence. Do not drink any alcohol while you are taking these medications.     Opioid pain medications can cause addiction. If you have a history of chemical dependency of any type, you are at a higher risk of becoming addicted to pain medications.  Only take these prescribed medications to treat your pain when all other options have been tried. Take it for as short a time and as few doses as possible. Store your pain pills in a secure place, as they are frequently stolen and provide a dangerous opportunity for children or visitors in your house to start abusing these powerful medications. We will not replace any lost or stolen medicine.  As soon as your pain is better, you should flush all your remaining medication.     Many prescription pain medications contain Tylenol  (acetaminophen), including Vicodin , Tylenol #3 , Norco , Lortab , and Percocet .  You should not take any extra pills of Tylenol  if you are using these prescription medications or you can get very sick.  Do not ever take more than 3000 mg of acetaminophen in any 24 hour period.    All opioids tend to cause constipation. Drink plenty of water and eat foods that have a lot of fiber, such as fruits, vegetables, prune juice, apple juice and high fiber cereal.  Take a laxative if you don t move your bowels at least every other day. Miralax , Milk of Magnesia, Colace , or Senna  can be used to keep you regular.      Remember that you can always come back to the Emergency Department if you are not able to see your regular doctor in the amount of time listed above, if you get any new symptoms, or if there is anything that worries you.

## 2019-08-07 NOTE — ED PROVIDER NOTES
"  History     Chief Complaint:  Shortness of Breath    HPI   Abdulaziz Mart is a 24 year old male, with a history of asthma, who presents to the ED for evaluation of shortness of breath. The patient says he woke up at about 330 AM and noticed shortness of breath because he ran out of his inhaler. The patient says that his asthma is the worst during the summer and fall, and uses it every day. The patient denies runny nose or cough. He also says that he smokes about 5 cigarettes a day. No fever.  No other concerns voiced at this time.    Allergies:  No known drug allergies    Medications:    Albuterol  Wellbutrin    Past Medical History:    Alcohol abuse  Asthma  Oppositional defiant disorder  Seasonal allergies  Sleep disorder    Past Surgical History:    History reviewed. No pertinent surgical history.    Family History:    History reviewed. No pertinent family history.     Social History:  Smoking status: Current Every Day Smoker-5 cigarettes a day  Alcohol use: Yes     Review of Systems   HENT: Negative for rhinorrhea.    Respiratory: Positive for shortness of breath. Negative for cough.    All other systems reviewed and are negative.    Physical Exam   First Vitals:  Patient Vitals for the past 24 hrs:   BP Temp Temp src Pulse Heart Rate Resp SpO2 Height Weight   08/07/19 0612 -- -- -- -- 78 18 98 % -- --   08/07/19 0516 128/88 98.7  F (37.1  C) Temporal 67 -- 20 98 % 1.727 m (5' 8\") 59 kg (130 lb)     Physical Exam    General:              Well-nourished              Speaking in full sentences  Eyes:              Conjunctiva without injection or scleral icterus  ENT:              Moist mucous membranes              Nares patent              Pinnae normal  Neck:              Full ROM              No stiffness appreciated  Resp:              Faint inspiratory and expiratory wheezing              No focal crackles  CV:                    Normal rate, regular rhythm              S1 and S2 present              No " murmur, gallop or rub  GI:              BS present              Abdomen soft without distention              Non-tender to light and deep palpation              No guarding or rebound tenderness  Skin:              Warm, dry, well perfused              No rashes or open wounds on exposed skin  MSK:              Moves all extremities              No focal deformities or swelling  Neuro:              Alert              Answers questions appropriately              Moves all extremities equally              Gait stable  Psych:              Normal affect, normal mood    Emergency Department Course     Interventions:  0548 Duoneb 3 mL Nebulization    Emergency Department Course:  Past medical records, nursing notes, and vitals reviewed.  0533: I performed an exam of the patient and obtained history, as documented above.    0558: I rechecked the patient.  He is feeling improved following nebulizer, and repeat pulmonary exam improved as well. Findings and plan explained to the Patient. Patient discharged home with instructions regarding supportive care, medications, and reasons to return. The importance of close follow-up was reviewed.       Impression & Plan      Medical Decision Making:  Abdulaziz Mart is a 24 year old male who presents to the ER for evaluation of shortness of breath.  Vital signs on presentation reveal no acute abnormalities.   Differential diagnosis is broad, including but not limited to, acute asthma exacerbation, bronchitis, pneumonia, angioedema, foreign body aspiration, reactive airway disease, pneumothorax, cardiac equivalent, pulmonary embolism, viral induced wheezing, environmental exposure, upper airway obstruction, allergic phenomenon, etc.  Present history, examination and ED course are consistent with asthma exacerbation.  He feels improved following the above bronchodilatory therapy.  Repeat pulmonary exam reveals improvement in degree of breathing, improvement in work of breathing, and  absence of hypoxia.  Other etiologies were considered, as above, although presently felt to be unlikely.  No fever, cough or symptoms consistent with pneumonia.  History and exam not consistent with pulmonary embolism.  No indication for hospitalization at this time given the absence of hypoxia, improvement in subjective symptoms, and improvement in objective clinical signs.  Supportive outpatient management is indicated with follow-up in 48-72 hours with PCP.  Refills of home bronchodilator therapy was provided.  Return precautions discussed including increased wheezing, shortness of breath, fever, chest pain, fainting episode, or any other new or troubling symptoms.  Questions answered prior to discharge.  Patient felt comfortable with plan of care.    Diagnosis:    ICD-10-CM    1. Mild intermittent asthma with exacerbation J45.21        Disposition:  discharged to home    Discharge Medications:     Medication List      Modified    * albuterol 108 (90 Base) MCG/ACT inhaler  Commonly known as:  PROAIR HFA/PROVENTIL HFA/VENTOLIN HFA  2 puffs, Inhalation, EVERY 4 HOURS PRN  What changed:  Another medication with the same name was added. Make sure you understand how and when to take each.                     Stone Milton  8/7/2019   [facility] EMERGENCY DEPARTMENT  Scribe Disclosure:  IStone, am serving as a scribe at 5:33 AM on 8/7/2019 to document services personally performed by Chema Wolfe MD based on my observations and the provider's statements to me.        Chema Wolfe MD  08/07/19 0656

## 2019-08-07 NOTE — ED AVS SNAPSHOT
[facility] Emergency Department  [address]  [facility] [address]  Phone:  [phone]  Fax:  [phone]                                    Abdulaziz Mart   MRN: [id number]    Department:  [facility] Emergency Department   Date of Visit:  8/7/2019           After Visit Summary Signature Page    I have received my discharge instructions, and my questions have been answered. I have discussed any challenges I see with this plan with the nurse or doctor.    ..........................................................................................................................................  Patient/Patient Representative Signature      ..........................................................................................................................................  Patient Representative Print Name and Relationship to Patient    ..................................................               ................................................  Date                                   Time    ..........................................................................................................................................  Reviewed by Signature/Title    ...................................................              ..............................................  Date                                               Time          22EPIC Rev 08/18

## 2019-11-24 ENCOUNTER — HOSPITAL ENCOUNTER (EMERGENCY)
Facility: CLINIC | Age: 24
Discharge: HOME | End: 2019-11-24
Attending: EMERGENCY MEDICINE | Admitting: EMERGENCY MEDICINE
Payer: COMMERCIAL

## 2019-11-24 VITALS
RESPIRATION RATE: 20 BRPM | OXYGEN SATURATION: 94 % | SYSTOLIC BLOOD PRESSURE: 119 MMHG | HEART RATE: 98 BPM | DIASTOLIC BLOOD PRESSURE: 89 MMHG | TEMPERATURE: 99.1 F

## 2019-11-24 DIAGNOSIS — F19.10 POLYSUBSTANCE ABUSE (H): ICD-10-CM

## 2019-11-24 DIAGNOSIS — F10.932 ALCOHOL WITHDRAWAL, WITH PERCEPTUAL DISTURBANCE (H): ICD-10-CM

## 2019-11-24 LAB
ALBUMIN SERPL-MCNC: 3.9 G/DL (ref 3.4–5)
ALP SERPL-CCNC: 88 U/L (ref 40–150)
ALT SERPL W P-5'-P-CCNC: 24 U/L (ref 0–70)
ANION GAP SERPL CALCULATED.3IONS-SCNC: 7 MMOL/L (ref 3–14)
AST SERPL W P-5'-P-CCNC: 24 U/L (ref 0–45)
BASOPHILS # BLD AUTO: 0 10E9/L (ref 0–0.2)
BASOPHILS NFR BLD AUTO: 0.6 %
BILIRUB SERPL-MCNC: 0.3 MG/DL (ref 0.2–1.3)
BUN SERPL-MCNC: 10 MG/DL (ref 7–30)
CALCIUM SERPL-MCNC: 8.6 MG/DL (ref 8.5–10.1)
CHLORIDE SERPL-SCNC: 106 MMOL/L (ref 94–109)
CO2 SERPL-SCNC: 27 MMOL/L (ref 20–32)
CREAT SERPL-MCNC: 0.81 MG/DL (ref 0.66–1.25)
DIFFERENTIAL METHOD BLD: NORMAL
EOSINOPHIL # BLD AUTO: 0.1 10E9/L (ref 0–0.7)
EOSINOPHIL NFR BLD AUTO: 2.5 %
ERYTHROCYTE [DISTWIDTH] IN BLOOD BY AUTOMATED COUNT: 12 % (ref 10–15)
ETHANOL SERPL-MCNC: <0.01 G/DL
GFR SERPL CREATININE-BSD FRML MDRD: >90 ML/MIN/{1.73_M2}
GLUCOSE SERPL-MCNC: 121 MG/DL (ref 70–99)
HCT VFR BLD AUTO: 46.1 % (ref 40–53)
HGB BLD-MCNC: 15.3 G/DL (ref 13.3–17.7)
IMM GRANULOCYTES # BLD: 0 10E9/L (ref 0–0.4)
IMM GRANULOCYTES NFR BLD: 0.2 %
LYMPHOCYTES # BLD AUTO: 1.4 10E9/L (ref 0.8–5.3)
LYMPHOCYTES NFR BLD AUTO: 26.5 %
MCH RBC QN AUTO: 31.1 PG (ref 26.5–33)
MCHC RBC AUTO-ENTMCNC: 33.2 G/DL (ref 31.5–36.5)
MCV RBC AUTO: 94 FL (ref 78–100)
MONOCYTES # BLD AUTO: 0.5 10E9/L (ref 0–1.3)
MONOCYTES NFR BLD AUTO: 9.3 %
NEUTROPHILS # BLD AUTO: 3.1 10E9/L (ref 1.6–8.3)
NEUTROPHILS NFR BLD AUTO: 60.9 %
NRBC # BLD AUTO: 0 10*3/UL
NRBC BLD AUTO-RTO: 0 /100
PLATELET # BLD AUTO: 172 10E9/L (ref 150–450)
POTASSIUM SERPL-SCNC: 3.8 MMOL/L (ref 3.4–5.3)
PROT SERPL-MCNC: 7.4 G/DL (ref 6.8–8.8)
RBC # BLD AUTO: 4.92 10E12/L (ref 4.4–5.9)
SODIUM SERPL-SCNC: 140 MMOL/L (ref 133–144)
WBC # BLD AUTO: 5.1 10E9/L (ref 4–11)

## 2019-11-24 PROCEDURE — 96374 THER/PROPH/DIAG INJ IV PUSH: CPT

## 2019-11-24 PROCEDURE — 80053 COMPREHEN METABOLIC PANEL: CPT | Performed by: EMERGENCY MEDICINE

## 2019-11-24 PROCEDURE — 80320 DRUG SCREEN QUANTALCOHOLS: CPT | Performed by: EMERGENCY MEDICINE

## 2019-11-24 PROCEDURE — 85025 COMPLETE CBC W/AUTO DIFF WBC: CPT | Performed by: EMERGENCY MEDICINE

## 2019-11-24 PROCEDURE — 96361 HYDRATE IV INFUSION ADD-ON: CPT

## 2019-11-24 PROCEDURE — 25800030 ZZH RX IP 258 OP 636: Performed by: EMERGENCY MEDICINE

## 2019-11-24 PROCEDURE — 25000132 ZZH RX MED GY IP 250 OP 250 PS 637: Performed by: EMERGENCY MEDICINE

## 2019-11-24 PROCEDURE — 25000128 H RX IP 250 OP 636: Performed by: EMERGENCY MEDICINE

## 2019-11-24 PROCEDURE — 99285 EMERGENCY DEPT VISIT HI MDM: CPT | Mod: 25

## 2019-11-24 RX ORDER — FOLIC ACID 1 MG/1
1 TABLET ORAL ONCE
Status: COMPLETED | OUTPATIENT
Start: 2019-11-24 | End: 2019-11-24

## 2019-11-24 RX ORDER — LIDOCAINE 40 MG/G
CREAM TOPICAL
Status: DISCONTINUED | OUTPATIENT
Start: 2019-11-24 | End: 2019-11-24 | Stop reason: HOSPADM

## 2019-11-24 RX ORDER — LANOLIN ALCOHOL/MO/W.PET/CERES
100 CREAM (GRAM) TOPICAL ONCE
Status: COMPLETED | OUTPATIENT
Start: 2019-11-24 | End: 2019-11-24

## 2019-11-24 RX ORDER — DIAZEPAM 10 MG/2ML
2.5 INJECTION, SOLUTION INTRAMUSCULAR; INTRAVENOUS ONCE
Status: COMPLETED | OUTPATIENT
Start: 2019-11-24 | End: 2019-11-24

## 2019-11-24 RX ORDER — MULTIPLE VITAMINS W/ MINERALS TAB 9MG-400MCG
1 TAB ORAL ONCE
Status: COMPLETED | OUTPATIENT
Start: 2019-11-24 | End: 2019-11-24

## 2019-11-24 RX ADMIN — SODIUM CHLORIDE 1000 ML: 9 INJECTION, SOLUTION INTRAVENOUS at 02:34

## 2019-11-24 RX ADMIN — Medication 100 MG: at 02:41

## 2019-11-24 RX ADMIN — FOLIC ACID 1 MG: 1 TABLET ORAL at 02:41

## 2019-11-24 RX ADMIN — DIAZEPAM 2.5 MG: 5 INJECTION, SOLUTION INTRAMUSCULAR; INTRAVENOUS at 02:42

## 2019-11-24 RX ADMIN — MULTIPLE VITAMINS W/ MINERALS TAB 1 TABLET: TAB at 02:41

## 2019-11-24 NOTE — ED AVS SNAPSHOT
[facility] Emergency Department  [address]  [facility] [address]  Phone:  [phone]  Fax:  [phone]                                    Abdulaziz Mart   MRN: [id number]    Department:  [facility] Emergency Department   Date of Visit:  11/24/2019           After Visit Summary Signature Page    I have received my discharge instructions, and my questions have been answered. I have discussed any challenges I see with this plan with the nurse or doctor.    ..........................................................................................................................................  Patient/Patient Representative Signature      ..........................................................................................................................................  Patient Representative Print Name and Relationship to Patient    ..................................................               ................................................  Date                                   Time    ..........................................................................................................................................  Reviewed by Signature/Title    ...................................................              ..............................................  Date                                               Time          22EPIC Rev 08/18

## 2019-11-24 NOTE — ED TRIAGE NOTES
"Pt states drinks \"regularly\" states last drink approx 24 hrs pta, states began trembling and having blurred vision approx 1 hr pta. ABCs intact GCS 15  "

## 2019-11-24 NOTE — ED PROVIDER NOTES
"  History     Chief Complaint:  Delirium Tremens (DTS)    The history is provided by the patient and a parent.      Abdulaziz Mart is a 24 year old male, with history of alcohol abuse, polysubstance abuse amongst others as noted below, who presents with his father for evaluation of suspected delirium tremens noted tonight prior to arrival. Patient reports that he regularly drinks, normally \"more than a couple beers\", mostly on the weekends, with last drink approximately 24 hours ago. One hour prior to arrival, patient was sitting on the couch, using his laptop, when he began to become tremulous, trouble talking, difficulty swallowing and became confused as to why he was on his laptop. Concerned, he went to tell his father who presented patient.     Here, patient states that he has history of DTS and alcohol withdrawal symptoms, but notes normally it is just tremors and he is able to just stay in bed all day. He also reports that he used cocaine, Lalita, acid and meth last weekend, but denies any use this weekend. He also denies any prescription medication usage, visual or auditory hallucinations, nausea, vomiting, diarrhea or fever. Patient has been through detox and outpatient treatment in the past, but relapsed. He states that he has been looking at programs, but not interested in detox at this time.     Allergies:  No Known Drug Allergies      Medications:    Albuterol inhaler  Wellbutrin    Past Medical History:    Alcohol abuse  Alcohol withdrawal  Marijuana abuse  Oppositional defiant disorder, mild  Sleep disorder  Depression  Polysubstance use    Past Surgical History:    History reviewed. No pertinent past surgical history.     Family History:    History reviewed. No pertinent family history.      Social History:  The patient was accompanied to the ED by father.  Smoking Status: Yes - current every day smoker  Smokeless Tobacco: No  Alcohol Use: Yes  Drug Use: Yes - daily   Marital Status:  Single     Review " of Systems   All other systems reviewed and are negative.    Physical Exam     Patient Vitals for the past 24 hrs:   BP Temp Temp src Pulse Heart Rate Resp SpO2   11/24/19 0315 119/89 -- -- 98 -- -- 94 %   11/24/19 0300 124/79 -- -- 61 -- -- 96 %   11/24/19 0245 128/86 -- -- 90 -- -- 96 %   11/24/19 0230 133/85 -- -- 77 -- -- 96 %   11/24/19 0157 (!) 125/97 99.1  F (37.3  C) Temporal -- 107 20 96 %       Physical Exam   Nursing note and vitals reviewed.  Constitutional: Cooperative.   HENT:   Mouth/Throat: Mucous membranes are normal.   Cardiovascular: Tachycardic rate, regular rhythm and normal heart sounds.  No murmur.  Pulmonary/Chest: Effort normal and breath sounds normal. No respiratory distress. No wheezes. No rales.   Abdominal: Soft. Normal appearance and bowel sounds are normal. No distension. There is no tenderness. There is no rigidity and no guarding.   Neurological: Alert. Tremulous. Oriented x4  Skin: Skin is warm and dry. No rash noted.   Psychiatric: Normal mood and affect.  Denies hallucinations.    Emergency Department Course     Laboratory:  Laboratory findings were communicated with the patient and family who voiced understanding of the findings.    CBC: AWNL (WBC 5.1, HGB 15.3, )  CMP: Glucose 121 (H) o/w WNL (Creatinine 0.81)    Alcohol ethyl: <0.01      Interventions:  0234 0.9% NaCl Bolus 1000 mL IV  0241 Thiamine 100 mg PO  0241 Folvite 1 mg PO  0241 Thera-vit-m 1 tablet PO  0242 Valium 2.5 mg IV    Emergency Department Course:  Past medical records, nursing notes, and vitals reviewed.    (0210)   I performed an exam of the patient as documented above. History obtained from patient and father.    IV was inserted and blood was drawn for laboratory testing, results above.     (0409)   I rechecked the patient and discussed the results of his workup thus far. Patient feels improved. Discussed plan of care and patient will be discharged.    Findings and plan explained to the Patient and  father. Patient discharged home with instructions regarding supportive care, medications, and reasons to return. The importance of close follow-up was reviewed. I personally reviewed the laboratory results with the Patient and father and answered all related questions prior to discharge.     Impression & Plan     Medical Decision Making:  Abdulaziz is a 24 year old gentleman with a history of polysubstance abuse and alcohol dependence who presents with symptoms likely representative of withdrawal syndrome from the alcohol. He usually experiences tremulousness, but has now had some perceptual disturbances as well. No described seizures. His hemodynamics are reassuring here. After small dose of IV valium, he feels better and has been observed for several hours. His dad is here and able to provide safe and sober transport home. He is not interested in alcohol detox at this time, but has completed a session at [facility] and would like to look into this in the near future. I've encouraged him to do so. No indication for admission or further work up and he will be discharged home in stable condition.     Discharge Diagnosis:    ICD-10-CM    1. Alcohol withdrawal, with perceptual disturbance (H) F10.232    2. Polysubstance abuse (H) F19.10        Disposition:  Discharged to home.    Scribe Disclosure:  I, Ema Rodriguez, am serving as a scribe at 2:10 AM on 11/24/2019 to document services personally performed by Dale Siegel MD based on my observations and the provider's statements to me.   11/24/2019   [facility] EMERGENCY DEPARTMENT       Dale Siegel MD  11/24/19 0436

## 2020-10-11 ENCOUNTER — NURSE TRIAGE (OUTPATIENT)
Dept: NURSING | Facility: CLINIC | Age: 25
End: 2020-10-11

## 2020-10-11 NOTE — TELEPHONE ENCOUNTER
Caller seeking refill of albuterol inhaler. Ran out   A d has no refills left; not wheezing now but state that he needs itwhen he bikes home from work  In [facility]; State he uses hi inhaler  At least   2-3 times a day an hasd anytime he feels chest tightness and panics; States he was prescribed advair but it does not help   pharmacy states he has used up his bridge RX for the year and cannot give him anymore without an RX   Caller does not work until tomorrow afternoon and only needs his inhaler when he bikes   Triage protocol reviewed   Advise to all clinic in morning to discuss need for appointment or  Refill   Advised to be sen urgently if symptoms worsen   Caller understands and will comply   Yenni Swift RN  FNA      Reason for Disposition    Uses more than 1 inhaler (MDI)/month    No asthma check-up in > 6 months    Additional Information    Negative: [1] SEVERE asthma attack (e.g., very SOB at rest, speaks in single words, loud wheezes) AND [2] not resolved after 2 nebulizer or inhaler treatments    Negative: Peak flow rate less than 50% of baseline level (RED Zone)    Negative: [1] Severe wheezing or coughing AND [2] doesn't have neb or inhaler available    Negative: Chest pain    Negative: [1] Hospitalized before with asthma AND [2] feels the same now    Negative: [1] MODERATE asthma attack (e.g., SOB at rest, speaks in phrases, audible wheezes) AND [2] not resolved after 2 nebulizer or inhaler treatments given 20 minutes apart    Negative: [1] Peak flow rate 50-80% of baseline level (YELLOW zone) AND [2] not resolved after 2 nebulizer or inhaler treatments given 20 minutes apart    Negative: Asthma medicine (nebulizer or inhaler) is needed more frequently than q 4 hours to keep you comfortable    Negative: Fever > 103 F (39.4 C)    Negative: [1] Fever > 101 F (38.3 C) AND [2] age > 60    Negative: Patient sounds very sick or weak to the triager    Negative: [1] Continuous (nonstop) coughing AND [2]  keeps from working or sleeping AND [3] not improved after 2 nebulizer or inhaler treatments given 20 minutes apart    Negative: [1] Wheezing or coughing AND [2] hasn't used neb or inhaler twice AND [3] it's available    Negative: [1] Influenza prevalent in community (or household) AND [2] flu symptoms (e.g., cough WITH fever, etc) AND [3] onset < 48 hours ago    [1] MILD asthma attack (e.g., no SOB at rest, mild SOB with walking, speaks normally in sentences, mild wheezing) AND [2]  persists > 24 hours on appropriate treatment    Protocols used: ASTHMA ATTACK-A-AH

## 2024-03-03 ENCOUNTER — OFFICE VISIT (OUTPATIENT)
Dept: FAMILY MEDICINE | Facility: CLINIC | Age: 29
End: 2024-03-03
Payer: COMMERCIAL

## 2024-03-03 VITALS
OXYGEN SATURATION: 97 % | SYSTOLIC BLOOD PRESSURE: 110 MMHG | HEART RATE: 66 BPM | RESPIRATION RATE: 20 BRPM | TEMPERATURE: 97.1 F | DIASTOLIC BLOOD PRESSURE: 69 MMHG | WEIGHT: 145 LBS | BODY MASS INDEX: 22.05 KG/M2

## 2024-03-03 DIAGNOSIS — M62.838 NECK MUSCLE SPASM: Primary | ICD-10-CM

## 2024-03-03 PROCEDURE — 99203 OFFICE O/P NEW LOW 30 MIN: CPT

## 2024-03-03 RX ORDER — CYCLOBENZAPRINE HCL 5 MG
5-10 TABLET ORAL 3 TIMES DAILY PRN
Qty: 10 TABLET | Refills: 0 | Status: SHIPPED | OUTPATIENT
Start: 2024-03-03

## 2024-03-03 NOTE — PROGRESS NOTES
ICD-10-CM    1. Neck muscle spasm  M62.838 cyclobenzaprine (FLEXERIL) 5 MG tablet        With mild torticollis.  No neurological compromise at this time.    PLAN:  Patient Instructions   Use Flexeril 5-10 mg per dose up to every 8 hours as needed.  Do not drive or operate heavy machinery within 8 hours of taking this medication.    Use ibuprofen 600 mg three times daily for the next 48-72 hours.    SUBJECTIVE:  Abdulaziz Mart is a 28 year old male who presents to  today unable to turn his head to the right and minimally able to forward flex the neck.  He felt like something was a little off yesterday but woke up this morning with very stiff neck.  Still able to turn to the left and look up without problems.  No numbness/weakness/tingling.  Has tried topical Icy Hot with lidocaine, which helped a little bit.  No Tylenol or ibuprofen yet.  No preceding trauma.    OBJECTIVE:  /69 (BP Location: Right arm, Patient Position: Sitting, Cuff Size: Adult Regular)   Pulse 66   Temp 97.1  F (36.2  C) (Tympanic)   Resp 20   Wt 65.8 kg (145 lb)   SpO2 97%   BMI 22.05 kg/m    GEN: sitting with very erect posture, turns whole body to turn to the right  Neck: visible spasm in R SCM, mildly tender to palpation, essentially no right rotation and forward flexion to about 20 degrees.  Full rotation to the left.  No LAD.  No posterior tenderness or other spasms noted.

## 2024-03-03 NOTE — PATIENT INSTRUCTIONS
Use Flexeril 5-10 mg per dose up to every 8 hours as needed.  Do not drive or operate heavy machinery within 8 hours of taking this medication.    Use ibuprofen 600 mg three times daily for the next 48-72 hours.

## 2025-01-17 ENCOUNTER — HOSPITAL ENCOUNTER (EMERGENCY)
Facility: HOSPITAL | Age: 30
Discharge: HOME | End: 2025-01-17
Payer: OTHER MISCELLANEOUS

## 2025-01-17 ENCOUNTER — APPOINTMENT (OUTPATIENT)
Dept: RADIOLOGY | Facility: HOSPITAL | Age: 30
End: 2025-01-17
Payer: OTHER MISCELLANEOUS

## 2025-01-17 VITALS
HEIGHT: 68 IN | SYSTOLIC BLOOD PRESSURE: 138 MMHG | DIASTOLIC BLOOD PRESSURE: 88 MMHG | TEMPERATURE: 98.4 F | BODY MASS INDEX: 25.46 KG/M2 | OXYGEN SATURATION: 96 % | WEIGHT: 168 LBS | RESPIRATION RATE: 12 BRPM | HEART RATE: 65 BPM

## 2025-01-17 DIAGNOSIS — S62.337A CLOSED DISPLACED FRACTURE OF NECK OF FIFTH METACARPAL BONE OF LEFT HAND, INITIAL ENCOUNTER: ICD-10-CM

## 2025-01-17 PROCEDURE — 29125 APPL SHORT ARM SPLINT STATIC: CPT | Mod: LT

## 2025-01-17 PROCEDURE — 73130 X-RAY EXAM OF HAND: CPT | Mod: LT

## 2025-01-17 PROCEDURE — 99284 EMERGENCY DEPT VISIT MOD MDM: CPT | Mod: 25

## 2025-01-17 ASSESSMENT — COLUMBIA-SUICIDE SEVERITY RATING SCALE - C-SSRS
2. HAVE YOU ACTUALLY HAD ANY THOUGHTS OF KILLING YOURSELF IN THE PAST MONTH?: NO
6. HAVE YOU EVER DONE ANYTHING, STARTED TO DO ANYTHING, OR PREPARED TO DO ANYTHING TO END YOUR LIFE?: NO
1. IN THE PAST MONTH, HAVE YOU WISHED YOU WERE DEAD OR WISHED YOU COULD GO TO SLEEP AND NOT WAKE UP?: NO

## 2025-01-17 NOTE — ED TRIAGE NOTES
Pt hurt his left hand and left 5th finger when his drill slipped at work  at 1100. No laceration.  Left hand is swollen.
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
## 2025-01-17 NOTE — DISCHARGE INSTRUCTIONS
You have a fracture in your left hand in  the fifth metacarpal.  We placed a splint today.  You should follow-up with the orthopedic clinic.  I have provided the contact information for you.  You should see them early next week for reassessment and to come up with a plan for the broken bone.  You can take Tylenol and ibuprofen as needed for pain.  You can take Tylenol 1000 mg then 3 hours later take ibuprofen 800 mg.  You can take ibuprofen up to 3 times per day and Tylenol up to 4 times per day.  Please do not take these if he have any problems with your kidney or liver.    Come back to the emergency room if you suddenly cannot feel your hand or have acute onset of severe worsening pain.  Otherwise you can follow-up with the orthopedic clinic.

## 2025-03-03 ENCOUNTER — TRANSFERRED RECORDS (OUTPATIENT)
Dept: HEALTH INFORMATION MANAGEMENT | Facility: CLINIC | Age: 30
End: 2025-03-03
Payer: COMMERCIAL